# Patient Record
Sex: MALE | Race: WHITE | Employment: OTHER | URBAN - NONMETROPOLITAN AREA
[De-identification: names, ages, dates, MRNs, and addresses within clinical notes are randomized per-mention and may not be internally consistent; named-entity substitution may affect disease eponyms.]

---

## 2017-01-10 ENCOUNTER — TELEPHONE (OUTPATIENT)
Dept: OTHER | Age: 27
End: 2017-01-10

## 2017-01-12 ENCOUNTER — OFFICE VISIT (OUTPATIENT)
Dept: OTHER | Age: 27
End: 2017-01-12

## 2017-01-12 VITALS — WEIGHT: 213.4 LBS | BODY MASS INDEX: 33.49 KG/M2 | HEIGHT: 67 IN

## 2017-01-12 DIAGNOSIS — N18.4 CHRONIC KIDNEY DISEASE, STAGE 4 (SEVERE) (HCC): Primary | ICD-10-CM

## 2017-01-12 PROCEDURE — 97802 MEDICAL NUTRITION INDIV IN: CPT | Performed by: DIETITIAN, REGISTERED

## 2017-01-12 PROCEDURE — 1036F TOBACCO NON-USER: CPT | Performed by: DIETITIAN, REGISTERED

## 2017-03-02 LAB
BASOPHILS ABSOLUTE: ABNORMAL /ΜL
BASOPHILS RELATIVE PERCENT: ABNORMAL %
BUN BLDV-MCNC: 60 MG/DL
CALCIUM SERPL-MCNC: 9.4 MG/DL
CHLORIDE BLD-SCNC: 108 MMOL/L
CO2: 27 MMOL/L
CREAT SERPL-MCNC: 3.62 MG/DL
EOSINOPHILS ABSOLUTE: ABNORMAL /ΜL
EOSINOPHILS RELATIVE PERCENT: ABNORMAL %
GFR CALCULATED: 20
GLUCOSE BLD-MCNC: 90 MG/DL
HCT VFR BLD CALC: 37.8 % (ref 41–53)
HEMOGLOBIN: 13 G/DL (ref 13.5–17.5)
LYMPHOCYTES ABSOLUTE: ABNORMAL /ΜL
LYMPHOCYTES RELATIVE PERCENT: ABNORMAL %
MCH RBC QN AUTO: ABNORMAL PG
MCHC RBC AUTO-ENTMCNC: ABNORMAL G/DL
MCV RBC AUTO: ABNORMAL FL
MONOCYTES ABSOLUTE: ABNORMAL /ΜL
MONOCYTES RELATIVE PERCENT: ABNORMAL %
NEUTROPHILS ABSOLUTE: ABNORMAL /ΜL
NEUTROPHILS RELATIVE PERCENT: ABNORMAL %
PLATELET # BLD: 264 K/ΜL
PMV BLD AUTO: ABNORMAL FL
POTASSIUM SERPL-SCNC: 3.9 MMOL/L
RBC # BLD: 4.31 10^6/ΜL
SODIUM BLD-SCNC: 145 MMOL/L
VITAMIN D 25-HYDROXY: 36.69
VITAMIN D2, 25 HYDROXY: NORMAL
VITAMIN D3,25 HYDROXY: NORMAL
WBC # BLD: 12.3 10^3/ML

## 2017-03-03 ENCOUNTER — OFFICE VISIT (OUTPATIENT)
Dept: NEPHROLOGY | Age: 27
End: 2017-03-03

## 2017-03-03 VITALS
BODY MASS INDEX: 33.05 KG/M2 | RESPIRATION RATE: 16 BRPM | WEIGHT: 211 LBS | HEART RATE: 80 BPM | SYSTOLIC BLOOD PRESSURE: 138 MMHG | DIASTOLIC BLOOD PRESSURE: 90 MMHG

## 2017-03-03 DIAGNOSIS — N18.30 CHRONIC KIDNEY DISEASE, STAGE III (MODERATE) (HCC): Primary | ICD-10-CM

## 2017-03-03 PROCEDURE — G8427 DOCREV CUR MEDS BY ELIG CLIN: HCPCS | Performed by: INTERNAL MEDICINE

## 2017-03-03 PROCEDURE — G8417 CALC BMI ABV UP PARAM F/U: HCPCS | Performed by: INTERNAL MEDICINE

## 2017-03-03 PROCEDURE — 99213 OFFICE O/P EST LOW 20 MIN: CPT | Performed by: INTERNAL MEDICINE

## 2017-03-03 PROCEDURE — 1036F TOBACCO NON-USER: CPT | Performed by: INTERNAL MEDICINE

## 2017-03-03 PROCEDURE — G8484 FLU IMMUNIZE NO ADMIN: HCPCS | Performed by: INTERNAL MEDICINE

## 2017-03-03 RX ORDER — ALLOPURINOL 100 MG/1
100 TABLET ORAL DAILY
Qty: 180 TABLET | Refills: 3 | Status: SHIPPED | OUTPATIENT
Start: 2017-03-03 | End: 2017-07-24 | Stop reason: SDUPTHER

## 2017-03-03 ASSESSMENT — ENCOUNTER SYMPTOMS
DIARRHEA: 0
CHEST TIGHTNESS: 0
GASTROINTESTINAL NEGATIVE: 1
FACIAL SWELLING: 0
NAUSEA: 0
VOMITING: 0
RESPIRATORY NEGATIVE: 1
COUGH: 0
ABDOMINAL DISTENTION: 0
BACK PAIN: 0
WHEEZING: 0
SHORTNESS OF BREATH: 0
CONSTIPATION: 0
BLOOD IN STOOL: 0
ABDOMINAL PAIN: 0

## 2017-03-13 ENCOUNTER — TELEPHONE (OUTPATIENT)
Dept: NEPHROLOGY | Age: 27
End: 2017-03-13

## 2017-03-13 LAB
BUN BLDV-MCNC: 60 MG/DL
CALCIUM SERPL-MCNC: 10 MG/DL
CHLORIDE BLD-SCNC: 107 MMOL/L
CO2: 24 MMOL/L
CREAT SERPL-MCNC: 3.8 MG/DL
GFR CALCULATED: 20
GLUCOSE BLD-MCNC: 95 MG/DL
MAGNESIUM: 1.9 MG/DL
PHOSPHORUS: 4.9 MG/DL
POTASSIUM SERPL-SCNC: 4.2 MMOL/L
PTH INTACT: 23.4
SODIUM BLD-SCNC: 142 MMOL/L

## 2017-03-14 LAB
BASOPHILS ABSOLUTE: ABNORMAL /ΜL
BASOPHILS RELATIVE PERCENT: ABNORMAL %
CREATININE, URINE: 56
EOSINOPHILS ABSOLUTE: ABNORMAL /ΜL
EOSINOPHILS RELATIVE PERCENT: ABNORMAL %
HCT VFR BLD CALC: 38.3 % (ref 41–53)
HEMOGLOBIN: 13.3 G/DL (ref 13.5–17.5)
LYMPHOCYTES ABSOLUTE: ABNORMAL /ΜL
LYMPHOCYTES RELATIVE PERCENT: ABNORMAL %
MCH RBC QN AUTO: ABNORMAL PG
MCHC RBC AUTO-ENTMCNC: ABNORMAL G/DL
MCV RBC AUTO: ABNORMAL FL
MICROALBUMIN/CREAT 24H UR: 1862.3 MG/G{CREAT}
MICROALBUMIN/CREAT UR-RTO: 3325.5
MONOCYTES ABSOLUTE: ABNORMAL /ΜL
MONOCYTES RELATIVE PERCENT: ABNORMAL %
NEUTROPHILS ABSOLUTE: ABNORMAL /ΜL
NEUTROPHILS RELATIVE PERCENT: ABNORMAL %
PLATELET # BLD: 269 K/ΜL
PMV BLD AUTO: ABNORMAL FL
RBC # BLD: 4.45 10^6/ΜL
WBC # BLD: 13.09 10^3/ML

## 2017-04-12 ENCOUNTER — TELEPHONE (OUTPATIENT)
Dept: NEPHROLOGY | Age: 27
End: 2017-04-12

## 2017-04-15 LAB
ALBUMIN: 3.6
BUN BLDV-MCNC: 55 MG/DL
CALCIUM SERPL-MCNC: 9.3 MG/DL
CHLORIDE BLD-SCNC: 107 MMOL/L
CO2: 26 MMOL/L
CREAT SERPL-MCNC: 4.2 MG/DL
FERRITIN: 123 NG/ML (ref 18–300)
GFR CALCULATED: 18
GLUCOSE BLD-MCNC: NORMAL MG/DL
HCT VFR BLD CALC: 36.5 % (ref 41–53)
HEMOGLOBIN: 12.8 G/DL (ref 13.5–17.5)
PHOSPHORUS: 4.8 MG/DL
POTASSIUM SERPL-SCNC: 4.3 MMOL/L
PTH INTACT: 39.4
SODIUM BLD-SCNC: 143 MMOL/L

## 2017-04-18 ENCOUNTER — OFFICE VISIT (OUTPATIENT)
Dept: NEPHROLOGY | Age: 27
End: 2017-04-18

## 2017-04-18 VITALS
OXYGEN SATURATION: 98 % | SYSTOLIC BLOOD PRESSURE: 142 MMHG | BODY MASS INDEX: 33.83 KG/M2 | DIASTOLIC BLOOD PRESSURE: 78 MMHG | HEART RATE: 74 BPM | WEIGHT: 216 LBS

## 2017-04-18 DIAGNOSIS — N18.4 CHRONIC KIDNEY DISEASE, STAGE IV (SEVERE) (HCC): Primary | ICD-10-CM

## 2017-04-18 DIAGNOSIS — N18.30 CHRONIC KIDNEY DISEASE, STAGE III (MODERATE) (HCC): ICD-10-CM

## 2017-04-18 LAB
BACTERIA URINE, POC: ABNORMAL
BILIRUBIN URINE: ABNORMAL MG/DL
BLOOD, URINE: NEGATIVE
CASTS URINE, POC: ABNORMAL
CLARITY: CLEAR
COLOR: YELLOW
CRYSTALS URINE, POC: ABNORMAL
EPI CELLS URINE, POC: ABNORMAL
GLUCOSE URINE: NEGATIVE
KETONES, URINE: NEGATIVE
LEUKOCYTE EST, POC: NEGATIVE
NITRITE, URINE: NEGATIVE
PH UA: 5 (ref 4.5–8)
PROTEIN UA: POSITIVE
RBC URINE, POC: ABNORMAL
SPECIFIC GRAVITY UA: ABNORMAL (ref 1–1.03)
UROBILINOGEN, URINE: ABNORMAL
WBC URINE, POC: ABNORMAL
YEAST URINE, POC: ABNORMAL

## 2017-04-18 PROCEDURE — 1036F TOBACCO NON-USER: CPT | Performed by: INTERNAL MEDICINE

## 2017-04-18 PROCEDURE — 81002 URINALYSIS NONAUTO W/O SCOPE: CPT | Performed by: INTERNAL MEDICINE

## 2017-04-18 PROCEDURE — G8427 DOCREV CUR MEDS BY ELIG CLIN: HCPCS | Performed by: INTERNAL MEDICINE

## 2017-04-18 PROCEDURE — G8417 CALC BMI ABV UP PARAM F/U: HCPCS | Performed by: INTERNAL MEDICINE

## 2017-04-18 PROCEDURE — 99213 OFFICE O/P EST LOW 20 MIN: CPT | Performed by: INTERNAL MEDICINE

## 2017-04-18 RX ORDER — LAMOTRIGINE 100 MG/1
100 TABLET ORAL 2 TIMES DAILY
COMMUNITY
Start: 2017-04-06

## 2017-04-18 ASSESSMENT — ENCOUNTER SYMPTOMS
GASTROINTESTINAL NEGATIVE: 1
VOMITING: 0
SHORTNESS OF BREATH: 0
ABDOMINAL DISTENTION: 0
FACIAL SWELLING: 0
RESPIRATORY NEGATIVE: 1
BLOOD IN STOOL: 0
CONSTIPATION: 0
WHEEZING: 0
NAUSEA: 0
ABDOMINAL PAIN: 0
BACK PAIN: 0
DIARRHEA: 0
CHEST TIGHTNESS: 0
COUGH: 0

## 2017-06-17 LAB
ALBUMIN: 3.5
BASOPHILS ABSOLUTE: ABNORMAL /ΜL
BASOPHILS RELATIVE PERCENT: ABNORMAL %
BUN BLDV-MCNC: 65 MG/DL
CALCIUM SERPL-MCNC: 9 MG/DL
CHLORIDE BLD-SCNC: 110 MMOL/L
CO2: 24 MMOL/L
CREAT SERPL-MCNC: 4.3 MG/DL
EOSINOPHILS ABSOLUTE: ABNORMAL /ΜL
EOSINOPHILS RELATIVE PERCENT: ABNORMAL %
FERRITIN: 147 NG/ML (ref 18–300)
GFR CALCULATED: 18
GLUCOSE BLD-MCNC: 91 MG/DL
HCT VFR BLD CALC: 34.8 % (ref 41–53)
HEMOGLOBIN: 12 G/DL (ref 13.5–17.5)
LYMPHOCYTES ABSOLUTE: ABNORMAL /ΜL
LYMPHOCYTES RELATIVE PERCENT: ABNORMAL %
MCH RBC QN AUTO: ABNORMAL PG
MCHC RBC AUTO-ENTMCNC: ABNORMAL G/DL
MCV RBC AUTO: ABNORMAL FL
MONOCYTES ABSOLUTE: ABNORMAL /ΜL
MONOCYTES RELATIVE PERCENT: ABNORMAL %
NEUTROPHILS ABSOLUTE: ABNORMAL /ΜL
NEUTROPHILS RELATIVE PERCENT: ABNORMAL %
PHOSPHORUS: 4.8 MG/DL
PLATELET # BLD: 300 K/ΜL
PMV BLD AUTO: ABNORMAL FL
POTASSIUM SERPL-SCNC: 4.1 MMOL/L
PTH INTACT: 94.1
RBC # BLD: 4.04 10^6/ΜL
SODIUM BLD-SCNC: 91 MMOL/L
WBC # BLD: 10.49 10^3/ML

## 2017-06-20 ENCOUNTER — OFFICE VISIT (OUTPATIENT)
Dept: NEPHROLOGY | Age: 27
End: 2017-06-20

## 2017-06-20 VITALS
DIASTOLIC BLOOD PRESSURE: 82 MMHG | WEIGHT: 218 LBS | OXYGEN SATURATION: 99 % | SYSTOLIC BLOOD PRESSURE: 145 MMHG | BODY MASS INDEX: 35.19 KG/M2 | HEART RATE: 76 BPM

## 2017-06-20 DIAGNOSIS — N18.4 CHRONIC KIDNEY DISEASE, STAGE IV (SEVERE) (HCC): Primary | ICD-10-CM

## 2017-06-20 LAB
BACTERIA URINE, POC: ABNORMAL
BILIRUBIN URINE: ABNORMAL MG/DL
BLOOD, URINE: POSITIVE
CASTS URINE, POC: ABNORMAL
CLARITY: CLEAR
COLOR: YELLOW
CRYSTALS URINE, POC: ABNORMAL
EPI CELLS URINE, POC: ABNORMAL
GLUCOSE URINE: NEGATIVE
KETONES, URINE: NEGATIVE
LEUKOCYTE EST, POC: NEGATIVE
NITRITE, URINE: NEGATIVE
PH UA: 5 (ref 4.5–8)
PROTEIN UA: ABNORMAL
RBC URINE, POC: ABNORMAL
SPECIFIC GRAVITY UA: ABNORMAL (ref 1–1.03)
UROBILINOGEN, URINE: ABNORMAL
WBC URINE, POC: ABNORMAL
YEAST URINE, POC: ABNORMAL

## 2017-06-20 PROCEDURE — 1036F TOBACCO NON-USER: CPT | Performed by: INTERNAL MEDICINE

## 2017-06-20 PROCEDURE — G8417 CALC BMI ABV UP PARAM F/U: HCPCS | Performed by: INTERNAL MEDICINE

## 2017-06-20 PROCEDURE — G8427 DOCREV CUR MEDS BY ELIG CLIN: HCPCS | Performed by: INTERNAL MEDICINE

## 2017-06-20 PROCEDURE — 81000 URINALYSIS NONAUTO W/SCOPE: CPT | Performed by: INTERNAL MEDICINE

## 2017-06-20 PROCEDURE — 99213 OFFICE O/P EST LOW 20 MIN: CPT | Performed by: INTERNAL MEDICINE

## 2017-06-20 RX ORDER — AMLODIPINE BESYLATE 10 MG/1
5 TABLET ORAL DAILY
Qty: 100 TABLET | Refills: 3 | Status: SHIPPED | OUTPATIENT
Start: 2017-06-20 | End: 2017-08-10 | Stop reason: DRUGHIGH

## 2017-06-20 ASSESSMENT — ENCOUNTER SYMPTOMS
NAUSEA: 0
BACK PAIN: 0
RESPIRATORY NEGATIVE: 1
SHORTNESS OF BREATH: 0
GASTROINTESTINAL NEGATIVE: 1
DIARRHEA: 0
COUGH: 0
WHEEZING: 0
CHEST TIGHTNESS: 0
ABDOMINAL DISTENTION: 0
ABDOMINAL PAIN: 0
BLOOD IN STOOL: 0
CONSTIPATION: 0
VOMITING: 0
FACIAL SWELLING: 0

## 2017-07-18 RX ORDER — VIT B COMP NO.3/FOLIC/C/BIOTIN 1 MG-60 MG
TABLET ORAL
Qty: 90 TABLET | Refills: 3 | Status: SHIPPED | OUTPATIENT
Start: 2017-07-18 | End: 2018-03-20 | Stop reason: ALTCHOICE

## 2017-07-31 RX ORDER — ALLOPURINOL 100 MG/1
100 TABLET ORAL DAILY
Qty: 90 TABLET | Refills: 1 | Status: SHIPPED | OUTPATIENT
Start: 2017-07-31 | End: 2018-02-15 | Stop reason: SDUPTHER

## 2017-08-10 RX ORDER — AMLODIPINE BESYLATE 10 MG/1
10 TABLET ORAL DAILY
COMMUNITY
End: 2018-03-20 | Stop reason: ALTCHOICE

## 2017-08-26 LAB
BASOPHILS ABSOLUTE: ABNORMAL /ΜL
BASOPHILS RELATIVE PERCENT: ABNORMAL %
BUN BLDV-MCNC: 67 MG/DL
CALCIUM SERPL-MCNC: 9 MG/DL
CHLORIDE BLD-SCNC: 107 MMOL/L
CO2: 24 MMOL/L
CREAT SERPL-MCNC: 4.8 MG/DL
CREATININE, URINE: 61.1
EOSINOPHILS ABSOLUTE: ABNORMAL /ΜL
EOSINOPHILS RELATIVE PERCENT: ABNORMAL %
GFR CALCULATED: 15
GLUCOSE BLD-MCNC: 83 MG/DL
HCT VFR BLD CALC: 35.5 % (ref 41–53)
HEMOGLOBIN: 12.5 G/DL (ref 13.5–17.5)
LYMPHOCYTES ABSOLUTE: ABNORMAL /ΜL
LYMPHOCYTES RELATIVE PERCENT: ABNORMAL %
MCH RBC QN AUTO: ABNORMAL PG
MCHC RBC AUTO-ENTMCNC: ABNORMAL G/DL
MCV RBC AUTO: ABNORMAL FL
MICROALBUMIN/CREAT 24H UR: 2164.5 MG/G{CREAT}
MICROALBUMIN/CREAT UR-RTO: 3542.6
MONOCYTES ABSOLUTE: ABNORMAL /ΜL
MONOCYTES RELATIVE PERCENT: ABNORMAL %
NEUTROPHILS ABSOLUTE: ABNORMAL /ΜL
NEUTROPHILS RELATIVE PERCENT: ABNORMAL %
PHOSPHORUS: 5.1 MG/DL
PLATELET # BLD: 261 K/ΜL
PMV BLD AUTO: ABNORMAL FL
POTASSIUM SERPL-SCNC: 4.2 MMOL/L
PTH INTACT: 105.8
RBC # BLD: 4.13 10^6/ΜL
SODIUM BLD-SCNC: 142 MMOL/L
URIC ACID: 7.1
WBC # BLD: 9.4 10^3/ML

## 2017-08-29 ENCOUNTER — OFFICE VISIT (OUTPATIENT)
Dept: NEPHROLOGY | Age: 27
End: 2017-08-29
Payer: COMMERCIAL

## 2017-08-29 VITALS
OXYGEN SATURATION: 99 % | BODY MASS INDEX: 35.99 KG/M2 | SYSTOLIC BLOOD PRESSURE: 143 MMHG | WEIGHT: 223 LBS | HEART RATE: 68 BPM | DIASTOLIC BLOOD PRESSURE: 98 MMHG

## 2017-08-29 DIAGNOSIS — N18.4 CHRONIC KIDNEY DISEASE, STAGE IV (SEVERE) (HCC): Primary | ICD-10-CM

## 2017-08-29 LAB
BACTERIA URINE, POC: ABNORMAL
BILIRUBIN URINE: ABNORMAL MG/DL
BLOOD, URINE: POSITIVE
CASTS URINE, POC: ABNORMAL
CLARITY: CLEAR
COLOR: ABNORMAL
CRYSTALS URINE, POC: ABNORMAL
EPI CELLS URINE, POC: ABNORMAL
GLUCOSE URINE: NEGATIVE
KETONES, URINE: NEGATIVE
LEUKOCYTE EST, POC: NEGATIVE
NITRITE, URINE: NEGATIVE
PH UA: 5 (ref 4.5–8)
PROTEIN UA: ABNORMAL
RBC URINE, POC: ABNORMAL
SPECIFIC GRAVITY UA: ABNORMAL (ref 1–1.03)
UROBILINOGEN, URINE: ABNORMAL
WBC URINE, POC: ABNORMAL
YEAST URINE, POC: ABNORMAL

## 2017-08-29 PROCEDURE — G8417 CALC BMI ABV UP PARAM F/U: HCPCS | Performed by: INTERNAL MEDICINE

## 2017-08-29 PROCEDURE — 1036F TOBACCO NON-USER: CPT | Performed by: INTERNAL MEDICINE

## 2017-08-29 PROCEDURE — G8427 DOCREV CUR MEDS BY ELIG CLIN: HCPCS | Performed by: INTERNAL MEDICINE

## 2017-08-29 PROCEDURE — 81000 URINALYSIS NONAUTO W/SCOPE: CPT | Performed by: INTERNAL MEDICINE

## 2017-08-29 PROCEDURE — 99213 OFFICE O/P EST LOW 20 MIN: CPT | Performed by: INTERNAL MEDICINE

## 2017-08-29 ASSESSMENT — ENCOUNTER SYMPTOMS
GASTROINTESTINAL NEGATIVE: 1
ABDOMINAL DISTENTION: 0
CHEST TIGHTNESS: 0
ABDOMINAL PAIN: 0
CONSTIPATION: 0
WHEEZING: 0
DIARRHEA: 0
BACK PAIN: 0
FACIAL SWELLING: 0
SHORTNESS OF BREATH: 0
COUGH: 0
BLOOD IN STOOL: 0
RESPIRATORY NEGATIVE: 1
VOMITING: 0
NAUSEA: 0

## 2017-10-06 ENCOUNTER — TELEPHONE (OUTPATIENT)
Dept: NEPHROLOGY | Age: 27
End: 2017-10-06

## 2017-10-28 LAB
ALBUMIN: 3.5
BUN BLDV-MCNC: 77 MG/DL
CALCIUM SERPL-MCNC: 9.2 MG/DL
CHLORIDE BLD-SCNC: 109 MMOL/L
CO2: 21 MMOL/L
CREAT SERPL-MCNC: 5.2 MG/DL
FERRITIN: 102 NG/ML (ref 18–300)
GFR CALCULATED: 14
GLUCOSE BLD-MCNC: 99 MG/DL
HCT VFR BLD CALC: 34.2 % (ref 41–53)
HEMOGLOBIN: 11.8 G/DL (ref 13.5–17.5)
IRON SATURATION: 28
IRON: 59
PHOSPHORUS: 5.7 MG/DL
POTASSIUM SERPL-SCNC: 3.9 MMOL/L
PTH INTACT: 82.6
RETICULOCYTE ABSOLUTE COUNT: NORMAL
RETICULOCYTE COUNT PCT: 1.5
SODIUM BLD-SCNC: 145 MMOL/L
TOTAL IRON BINDING CAPACITY: 210
VITAMIN D 25-HYDROXY: 24.5
VITAMIN D2, 25 HYDROXY: NORMAL
VITAMIN D3,25 HYDROXY: NORMAL

## 2017-10-31 ENCOUNTER — OFFICE VISIT (OUTPATIENT)
Dept: NEPHROLOGY | Age: 27
End: 2017-10-31
Payer: COMMERCIAL

## 2017-10-31 VITALS
OXYGEN SATURATION: 99 % | WEIGHT: 214.5 LBS | DIASTOLIC BLOOD PRESSURE: 85 MMHG | HEART RATE: 61 BPM | SYSTOLIC BLOOD PRESSURE: 162 MMHG | BODY MASS INDEX: 34.62 KG/M2

## 2017-10-31 DIAGNOSIS — N18.30 CHRONIC KIDNEY DISEASE, STAGE III (MODERATE) (HCC): Primary | ICD-10-CM

## 2017-10-31 LAB
BACTERIA URINE, POC: ABNORMAL
BILIRUBIN URINE: ABNORMAL MG/DL
BLOOD, URINE: POSITIVE
CASTS URINE, POC: ABNORMAL
CLARITY: CLEAR
COLOR: YELLOW
CRYSTALS URINE, POC: ABNORMAL
EPI CELLS URINE, POC: ABNORMAL
GLUCOSE URINE: NEGATIVE
KETONES, URINE: NEGATIVE
LEUKOCYTE EST, POC: NEGATIVE
NITRITE, URINE: NEGATIVE
PH UA: 6 (ref 4.5–8)
PROTEIN UA: ABNORMAL
RBC URINE, POC: ABNORMAL
SPECIFIC GRAVITY UA: ABNORMAL (ref 1–1.03)
UROBILINOGEN, URINE: ABNORMAL
WBC URINE, POC: ABNORMAL
YEAST URINE, POC: ABNORMAL

## 2017-10-31 PROCEDURE — G8427 DOCREV CUR MEDS BY ELIG CLIN: HCPCS | Performed by: INTERNAL MEDICINE

## 2017-10-31 PROCEDURE — G8484 FLU IMMUNIZE NO ADMIN: HCPCS | Performed by: INTERNAL MEDICINE

## 2017-10-31 PROCEDURE — G8417 CALC BMI ABV UP PARAM F/U: HCPCS | Performed by: INTERNAL MEDICINE

## 2017-10-31 PROCEDURE — 99213 OFFICE O/P EST LOW 20 MIN: CPT | Performed by: INTERNAL MEDICINE

## 2017-10-31 PROCEDURE — 81000 URINALYSIS NONAUTO W/SCOPE: CPT | Performed by: INTERNAL MEDICINE

## 2017-10-31 PROCEDURE — 1036F TOBACCO NON-USER: CPT | Performed by: INTERNAL MEDICINE

## 2017-10-31 ASSESSMENT — ENCOUNTER SYMPTOMS
VOMITING: 0
NAUSEA: 0
FACIAL SWELLING: 0
ABDOMINAL PAIN: 0
CONSTIPATION: 0
GASTROINTESTINAL NEGATIVE: 1
COUGH: 0
SHORTNESS OF BREATH: 0
DIARRHEA: 0
ABDOMINAL DISTENTION: 0
BACK PAIN: 0
RESPIRATORY NEGATIVE: 1
WHEEZING: 0
BLOOD IN STOOL: 0
CHEST TIGHTNESS: 0

## 2017-10-31 NOTE — PROGRESS NOTES
Visit Date: 10/31/2017      HPI:     Jacob Carolina is a 32 y.o. male who presents today for:  Chief Complaint   Patient presents with    Chronic Kidney Disease     Stage IV    Hypertension    Proteinuria       Current Outpatient Prescriptions   Medication Sig Dispense Refill    amLODIPine (NORVASC) 10 MG tablet Take 10 mg by mouth daily      allopurinol (ZYLOPRIM) 100 MG tablet Take 1 tablet by mouth daily 90 tablet 1    B Complex-C-Folic Acid (TORY-BLAKE RX) 1 MG TABS TAKE ONE TABLET BY MOUTH ONCE DAILY 90 tablet 3    lamoTRIgine (LAMICTAL) 25 MG tablet Take 25 mg by mouth 2 times daily       risperiDONE (RISPERDAL) 0.5 MG tablet Take 2 tablets by mouth every evening 60 tablet 2    LORazepam (ATIVAN) 1 MG tablet Take 1 tablet by mouth 3 times daily as needed      vitamin D (ERGOCALCIFEROL) 31778 UNITS CAPS capsule TAKE ONE CAPSULE BY MOUTH TWICE A MONTH (Patient taking differently: Take 50,000 Units by mouth once a week ) 6 capsule 3    NASONEX 50 MCG/ACT nasal spray USE TWO SPRAY(S) IN EACH NOSTRIL ONCE DAILY 1 Inhaler 3    levothyroxine (SYNTHROID) 88 MCG tablet Take 88 mcg by mouth daily       Magnesium Chloride (SLOW-MAG PO) Take 143 mg by mouth 2 times daily.  fexofenadine (ALLEGRA) 60 MG tablet Take 180 mg by mouth daily. No current facility-administered medications for this visit. Allergies   Allergen Reactions    Ceclor [Cefaclor]     Lisinopril      Should avoid taking r/t potassium issues, not true allergy    Tegretol [Carbamazepine]        Past Medical History:   Diagnosis Date    Anemia in chronic kidney disease(285.21)     Bowel obstruction 1988    Cerebral palsy (HCC)     Chronic kidney disease, stage IV (severe) (HCC)     if develops ESRD would do peritoneal dialysis and possible kidney transplant.     Developmental disorder     Hard of hearing     Hypertension     Hypothyroidism     Mentally disabled     Pre-transplant evaluation for CKD (chronic kidney 06/02/2016    ALKPHOS 75 04/02/2016     BNP: No results found for: BNP  Lipids: No results found for: CHOL, HDL  INR: No results found for: INR       URINE: No results found for: Gerardo Hook                            Lab Results   Component Value Date    NITRU Negative 08/29/2017    COLORU Light Yellow 08/29/2017    PHUR 5.0 08/29/2017    CLARITYU Clear 08/29/2017    LEUKOCYTESUR negative 08/29/2017    BLOODU Positive 08/29/2017    GLUCOSEU negative 08/29/2017    KETUA Negative 08/29/2017         Microalbumen/Creatinine ratio:  No components found for: RUCREAT  Assessment:   Chronic kidney disease stage 5 progressive   Behavior diorder            Plan:   Discussed capd and soft uremia  Discussed living donor transplant  Fu 6 months  Serial labs  The goal is to avoid nephrotoxins such as NSAIDs, contrast and dehydration. The patient was informed of these risk factors. If there are any questions the the patient is informed to call for further assistance.               Jalyn Silvestre DO

## 2017-11-01 ENCOUNTER — TELEPHONE (OUTPATIENT)
Dept: NEPHROLOGY | Age: 27
End: 2017-11-01

## 2017-11-01 DIAGNOSIS — N18.4 CKD (CHRONIC KIDNEY DISEASE), STAGE IV (HCC): ICD-10-CM

## 2017-11-01 DIAGNOSIS — N18.4 ANEMIA IN STAGE 4 CHRONIC KIDNEY DISEASE (HCC): Primary | ICD-10-CM

## 2017-11-01 DIAGNOSIS — D63.1 ANEMIA IN STAGE 4 CHRONIC KIDNEY DISEASE (HCC): Primary | ICD-10-CM

## 2017-11-01 NOTE — TELEPHONE ENCOUNTER
Chronic Care Management    John Paul Senior is a 32 y.o.oldmale Is followed for Chronic Care Management for kidney disease, Stage IV. Diagnostic Data:    BMP:  Lab Results   Component Value Date     10/28/2017    K 3.9 10/28/2017     10/28/2017    CO2 21 10/28/2017    BUN 77 10/28/2017    CREATININE 5.2 10/28/2017    LABGLOM 14 10/28/2017    LABGLOM 26 06/02/2016    GLUCOSE 99 10/28/2017       Anemia: Goal hgb 10 g/dL  Lab Results   Component Value Date    HGB 11.8 10/28/2017    HGB 12.5 08/26/2017    HGB 12.0 06/17/2017    HGB 12.8 04/15/2017    HGB 13.3 03/14/2017      Lab Results   Component Value Date    FERRITIN 102 10/28/2017    IRON 59 10/28/2017    LABIRON 28 10/28/2017     No results found for: RETICABS  Meds:  Aranesp: None   PO Iron: None   Venofer: None    Bone and Mineral Metabolism:   Goal:  Ca: 8.5-10.2 mg/dL   Phos:<4.5 mg/dL   Vit D:>30 ng/ml   PTH,Intact: CKD Stage III and IV Non Diab:   pg/dL. Diab: 110-150. Initiate Treatment at 150 pg/dL   PTH: CKD Stage V: 150-300. Initiate treatment at 300 pg/dL  Lab Results   Component Value Date    PHOS 5.7 10/28/2017    CALCIUM 9.2 10/28/2017     Meds: Phosphate binder needed? ??    PTH 82.6 10/28/17    Lab Results   Component Value Date    VITD25 24.5 10/28/2017     Meds: On Ergocalciferol 50,000 units twice monthly. Assessment:     Quarterly labs reviewed. May need initiation of phosphate binder. (Sent to Dr. Katerine Flor for review)  Progressive CKD IV, in transplant workup. Continuew with next labs per renal clinic orders in January 2018. Next appointment in this department:  11/15/2017 with me  12/11/17 with Dr. Katerine Flor.      Neville Sandhoff, CNP    Time spent- 17 minutes non face-to-face

## 2017-11-15 ENCOUNTER — OFFICE VISIT (OUTPATIENT)
Dept: NEPHROLOGY | Age: 27
End: 2017-11-15
Payer: COMMERCIAL

## 2017-11-15 VITALS
WEIGHT: 217 LBS | HEART RATE: 65 BPM | BODY MASS INDEX: 35.02 KG/M2 | DIASTOLIC BLOOD PRESSURE: 98 MMHG | OXYGEN SATURATION: 99 % | SYSTOLIC BLOOD PRESSURE: 158 MMHG

## 2017-11-15 DIAGNOSIS — N18.5 CKD (CHRONIC KIDNEY DISEASE), STAGE V (HCC): ICD-10-CM

## 2017-11-15 DIAGNOSIS — D63.1 ANEMIA IN STAGE 5 CHRONIC KIDNEY DISEASE, NOT ON CHRONIC DIALYSIS (HCC): Primary | ICD-10-CM

## 2017-11-15 DIAGNOSIS — N18.5 ANEMIA IN STAGE 5 CHRONIC KIDNEY DISEASE, NOT ON CHRONIC DIALYSIS (HCC): Primary | ICD-10-CM

## 2017-11-15 PROCEDURE — 99214 OFFICE O/P EST MOD 30 MIN: CPT | Performed by: NURSE PRACTITIONER

## 2017-11-15 NOTE — PROGRESS NOTES
Chronic Care Management Initial Visit    Patient Consent for Chronic Care Management Signed: Date: 11/15/2017    Primary Nephrologist: Dr Giulia Ratliff is a 32 y.o.oldmale came for follow up for Chronic Care Management for kidney disease, awaiting transplant approval. He hasn't been drinking much, has to be encouraged to drink. Having some swelling, fatigue, increased SOB. Sleeping all of the time. Renetta Kirkland most recent   Lab Results   Component Value Date    CREATININE 5.2 10/28/2017    LABGLOM 14 10/28/2017    LABGLOM 26 06/02/2016   . VITALS:  BP (!) 158/98 (Site: Right Arm, Position: Sitting, Cuff Size: Small Adult)   Pulse 65   Wt 217 lb (98.4 kg)   SpO2 99%   BMI 35.02 kg/m²   Body mass index is 35.02 kg/m². REVIEW OF SYSTEMS:   GENERAL: States he is fatigued, sleeps a lot. Low thirst, not drinking much. Weight is 3#  HEENT:  Denies Upper respiratory complaints  CARDIOVASCULAR: Denies chest pain/angina. RESPIRATORY: Some increased sob  ABDOMEN: Denies nausea, vomiting  CNS:Denies headache, dizziness  MUSCULOSKELETAL: Reports joint arthralgia, reports edema  SKIN: Denies rash, pruritis  HEMATOLOGIC: Denies bruising  ENDOCRINE:  Negative for heat or cold intolerance     EXAM:  VITALS:  BP (!) 158/98 (Site: Right Arm, Position: Sitting, Cuff Size: Small Adult)   Pulse 65   Wt 217 lb (98.4 kg)   SpO2 99%   BMI 35.02 kg/m²    Body mass index is 35.02 kg/m². Recheck /92  CONSTITUTIONAL:  No acute distress. HEENT:  Head is normocephalic, Neck is supple. Hearing aids. CARDIOVASCULAR: 2+ pitting edema  RESPIRATORY: No shortness of breath. NEUROLOGICAL: Patient is alert and oriented to person, place, and time.   SKIN: No rash on exposed surfaces, No significant bruises  MUSCULOSKELETAL: Movement is coordinated  EXTREMITIES: Moves all extremities  PSYCHIATRIC: mood and affect appropriate    Home Medication reviewed  The pt states compliance with meds and denies adverse effects. Uses pill box: yes - Mom helps with meds. Diagnostic Data:    Labs reviewed and discussed with pt  BMP:  Lab Results   Component Value Date     10/28/2017    K 3.9 10/28/2017     10/28/2017    CO2 21 10/28/2017    BUN 77 10/28/2017    CREATININE 5.2 10/28/2017    LABGLOM 14 10/28/2017    LABGLOM 26 06/02/2016    GLUCOSE 99 10/28/2017       Anemia: Goal hgb 10 g/dL  Education provided regarding the mechanism behind anemia associated with Chronic Kidney Disease. Lab Results   Component Value Date    HGB 11.8 10/28/2017    HGB 12.5 08/26/2017    HGB 12.0 06/17/2017    HGB 12.8 04/15/2017    HGB 13.3 03/14/2017      Lab Results   Component Value Date    FERRITIN 102 10/28/2017    IRON 59 10/28/2017    LABIRON 28 10/28/2017     No results found for: RETICABS  Last Colonoscopy Date:   Meds:  Aranesp: None   PO Iron: None   Venofer: None    Bone and Mineral Metabolism:   Education provided regarding the mechanism behind Ca, PTH, Phos, Vit D and bone health in Chronic Kidney Disease   Referral to Dietician for Phosphorus over 4.5     Immunization:  Immunization History   Administered Date(s) Administered    Influenza Vaccine, unspecified formulation 11/13/2013       Dialysis Plan:   Discussion and education provided regarding potential progression to End Stage Renal Disease and options for dialysis including Hemodialysis, Peritoneal dialysis, Transplant and No dialysis. Peritoneal Dialysis - Preferred modality    Transplant:    Transplant Center: [facility]   Referral Date: In progress      Needs: The pt denies that they need help with the phone, transportation, shopping, preparing meals, housework, laundry, medications or managing money? Advanced Care Planning:   Face to Face Discussion Date:  Full Code   DNR-Comfort Care Arrest  DNR-Comfort Care  DNR form completed Date:                    Assessment:   1.  Anemia in stage 5 chronic kidney disease, not on chronic dialysis ([facility] [address] Hemoglobin and Hematocrit, Blood   2. CKD (chronic kidney disease), stage V (HCC)  Hemoglobin and Hematocrit, Blood     Encounter Diagnoses   Name Primary?  Anemia in stage 5 chronic kidney disease, not on chronic dialysis ([facility] [address] Yes    CKD (chronic kidney disease), stage V ([facility] [address]      Plan:  Orders Placed This Encounter   Procedures    Hemoglobin and Hematocrit, Blood     Monthly for 12 months     Standing Status:   Standing     Number of Occurrences:   12     Standing Expiration Date:   11/15/2018     No orders of the defined types were placed in this encounter. Discussed with the patient and questioned answered. Patient advised to make early appointment if needed and to call office for questions, concerns, persistent, changing or worsening symptoms.     Next appointment in this department:  12/11/2017 Keep your appt with Dr. Isabel Medina   Follow Up at Pre Renal Clinic in 2 months    H&H in a month, then monthly    Nita Tang CNP APRN

## 2017-12-04 ENCOUNTER — TELEPHONE (OUTPATIENT)
Dept: NEPHROLOGY | Age: 27
End: 2017-12-04

## 2017-12-04 NOTE — TELEPHONE ENCOUNTER
Mother called and states pt is scheduled for kidney transplant on 12/12/17. Pt has an appt to see you on 12/11/17. Do you still need to see him?

## 2018-01-11 PROBLEM — I10 ESSENTIAL HYPERTENSION, BENIGN: Status: ACTIVE | Noted: 2018-01-11

## 2018-01-11 PROBLEM — Z94.0 LIVING RELATED DONOR RENAL TRANSPLANT: Status: ACTIVE | Noted: 2017-12-12

## 2018-02-15 RX ORDER — ALLOPURINOL 100 MG/1
100 TABLET ORAL DAILY
Qty: 90 TABLET | Refills: 1 | Status: SHIPPED | OUTPATIENT
Start: 2018-02-15 | End: 2018-03-20 | Stop reason: DRUGHIGH

## 2018-03-15 RX ORDER — EVEROLIMUS TABLETS 0.5 MG/1
0.5 TABLET ORAL
COMMUNITY
Start: 2018-01-29 | End: 2021-12-27 | Stop reason: SDUPTHER

## 2018-03-15 RX ORDER — CYCLOSPORINE 25 MG/1
100 CAPSULE, LIQUID FILLED ORAL
COMMUNITY
Start: 2018-01-05 | End: 2019-02-14

## 2018-03-20 ENCOUNTER — OFFICE VISIT (OUTPATIENT)
Dept: NEPHROLOGY | Age: 28
End: 2018-03-20
Payer: COMMERCIAL

## 2018-03-20 VITALS
WEIGHT: 206.7 LBS | OXYGEN SATURATION: 100 % | HEART RATE: 92 BPM | BODY MASS INDEX: 33.36 KG/M2 | SYSTOLIC BLOOD PRESSURE: 137 MMHG | DIASTOLIC BLOOD PRESSURE: 84 MMHG

## 2018-03-20 DIAGNOSIS — N18.30 CHRONIC KIDNEY DISEASE, STAGE III (MODERATE) (HCC): Primary | ICD-10-CM

## 2018-03-20 LAB
BACTERIA URINE, POC: ABNORMAL
BILIRUBIN URINE: ABNORMAL MG/DL
BLOOD, URINE: NEGATIVE
CASTS URINE, POC: ABNORMAL
CLARITY: CLEAR
COLOR: ABNORMAL
CRYSTALS URINE, POC: ABNORMAL
EPI CELLS URINE, POC: ABNORMAL
GLUCOSE URINE: NEGATIVE
KETONES, URINE: NEGATIVE
LEUKOCYTE EST, POC: NEGATIVE
NITRITE, URINE: NEGATIVE
PH UA: 5 (ref 4.5–8)
PROTEIN UA: POSITIVE
RBC URINE, POC: ABNORMAL
SPECIFIC GRAVITY UA: ABNORMAL (ref 1–1.03)
UROBILINOGEN, URINE: ABNORMAL
WBC URINE, POC: ABNORMAL
YEAST URINE, POC: ABNORMAL

## 2018-03-20 PROCEDURE — 1036F TOBACCO NON-USER: CPT | Performed by: INTERNAL MEDICINE

## 2018-03-20 PROCEDURE — G8417 CALC BMI ABV UP PARAM F/U: HCPCS | Performed by: INTERNAL MEDICINE

## 2018-03-20 PROCEDURE — 99213 OFFICE O/P EST LOW 20 MIN: CPT | Performed by: INTERNAL MEDICINE

## 2018-03-20 PROCEDURE — 81002 URINALYSIS NONAUTO W/O SCOPE: CPT | Performed by: INTERNAL MEDICINE

## 2018-03-20 PROCEDURE — G8427 DOCREV CUR MEDS BY ELIG CLIN: HCPCS | Performed by: INTERNAL MEDICINE

## 2018-03-20 PROCEDURE — G8484 FLU IMMUNIZE NO ADMIN: HCPCS | Performed by: INTERNAL MEDICINE

## 2018-03-20 RX ORDER — ALLOPURINOL 300 MG/1
300 TABLET ORAL DAILY
COMMUNITY
End: 2019-03-21 | Stop reason: SDUPTHER

## 2018-03-20 RX ORDER — PANTOPRAZOLE SODIUM 40 MG/1
40 TABLET, DELAYED RELEASE ORAL
COMMUNITY
Start: 2018-01-30

## 2018-03-20 RX ORDER — LABETALOL 100 MG/1
100 TABLET, FILM COATED ORAL 3 TIMES DAILY
COMMUNITY
Start: 2018-02-21 | End: 2022-01-25 | Stop reason: SDUPTHER

## 2018-03-20 RX ORDER — SULFAMETHOXAZOLE AND TRIMETHOPRIM 800; 160 MG/1; MG/1
TABLET ORAL
COMMUNITY
Start: 2018-02-23 | End: 2019-02-14

## 2018-03-20 ASSESSMENT — ENCOUNTER SYMPTOMS
RESPIRATORY NEGATIVE: 1
GASTROINTESTINAL NEGATIVE: 1

## 2018-03-20 NOTE — PROGRESS NOTES
Visit Date: 3/20/2018      HPI:     Karoline Ojeda is a 32 y.o. male who presents today for:  Chief Complaint   Patient presents with    Kidney Transplant    Chronic Kidney Disease     Stage III    Hypertension       Current Outpatient Prescriptions   Medication Sig Dispense Refill    allopurinol (ZYLOPRIM) 300 MG tablet Take 300 mg by mouth daily      labetalol (NORMODYNE) 100 MG tablet Take 100 mg by mouth      nystatin (MYCOSTATIN) 316933 UNIT/ML suspension 100,000 Units by Transmucosal route      pantoprazole (PROTONIX) 40 MG tablet Take 40 mg by mouth      sulfamethoxazole-trimethoprim (BACTRIM DS;SEPTRA DS) 800-160 MG per tablet Take by mouth      aspirin 81 MG tablet Take 81 mg by mouth daily      Everolimus 0.5 MG TABS Take 0.5 mg by mouth 1 mg in am and 0.5 mg in pm      cycloSPORINE modified (GENGRAF) 25 MG capsule Take 100 mg by mouth       lamoTRIgine (LAMICTAL) 25 MG tablet Take 25 mg by mouth 2 times daily       risperiDONE (RISPERDAL) 0.5 MG tablet Take 2 tablets by mouth every evening 60 tablet 2    LORazepam (ATIVAN) 1 MG tablet Take 1 tablet by mouth 3 times daily as needed      NASONEX 50 MCG/ACT nasal spray USE TWO SPRAY(S) IN EACH NOSTRIL ONCE DAILY 1 Inhaler 3    levothyroxine (SYNTHROID) 88 MCG tablet Take 88 mcg by mouth daily       fexofenadine (ALLEGRA) 60 MG tablet Take 180 mg by mouth daily. No current facility-administered medications for this visit. Allergies   Allergen Reactions    Ceclor [Cefaclor]     Lisinopril      Should avoid taking r/t potassium issues, not true allergy    Tegretol [Carbamazepine]        Past Medical History:   Diagnosis Date    Anemia in chronic kidney disease(285.21)     Bowel obstruction 1988    Cerebral palsy (HCC)     Chronic kidney disease, stage IV (severe) (HCC)     if develops ESRD would do peritoneal dialysis and possible kidney transplant.     Developmental disorder     Hard of hearing     Hypertension     alert and oriented to person, place, and time. Skin: Skin is warm and dry. No rash noted. He is not diaphoretic. No pallor. Psychiatric: He has a normal mood and affect. His behavior is normal. Judgment and thought content normal.   Vitals reviewed. CBC:   Lab Results   Component Value Date    WBC 9.40 08/26/2017    HGB 11.8 (A) 10/28/2017    HCT 34.2 (A) 10/28/2017    MCV 87.0 06/02/2016     08/26/2017     BMP:    Lab Results   Component Value Date     10/28/2017     08/26/2017    NA 91 06/17/2017    K 3.9 10/28/2017    K 4.2 08/26/2017    K 4.1 06/17/2017     10/28/2017     08/26/2017     06/17/2017    CO2 21 10/28/2017    CO2 24 08/26/2017    CO2 24 06/17/2017    BUN 77 10/28/2017    BUN 67 08/26/2017    BUN 65 06/17/2017    CREATININE 5.2 10/28/2017    CREATININE 4.8 08/26/2017    CREATININE 4.3 06/17/2017    GLUCOSE 99 10/28/2017    GLUCOSE 83 08/26/2017    GLUCOSE 91 06/17/2017   creat range 1.7-1.9 Hemoglobin 9.5  Hepatic:   Lab Results   Component Value Date    AST 51 (H) 06/02/2016    AST 26 04/02/2016    ALT 31 06/02/2016    ALT 33 04/02/2016    BILITOT 0.5 06/02/2016    BILITOT 0.3 04/02/2016    ALKPHOS 68 06/02/2016    ALKPHOS 75 04/02/2016     BNP: No results found for: BNP  Lipids: No results found for: CHOL, HDL  INR: No results found for: INR       URINE: No results found for: NAUR, PROTUR                            Lab Results   Component Value Date    NITRU Negative 10/31/2017    COLORU Yellow 10/31/2017    PHUR 6.0 10/31/2017    CLARITYU Clear 10/31/2017    LEUKOCYTESUR negative 10/31/2017    BLOODU Positive 10/31/2017    GLUCOSEU negative 10/31/2017    KETUA Negative 10/31/2017         Microalbumen/Creatinine ratio:  No components found for: RUCREAT    Assessment:     1. Chronic kidney disease, stage III (moderate)  POC URINE with Microscopic   post living related donor 3 months  Hypertension controlled  hypothyroidism          Plan:    Fu 6 months

## 2018-04-03 ENCOUNTER — TELEPHONE (OUTPATIENT)
Dept: NEPHROLOGY | Age: 28
End: 2018-04-03

## 2018-05-24 ENCOUNTER — INITIAL CONSULT (OUTPATIENT)
Dept: PULMONOLOGY | Age: 28
End: 2018-05-24
Payer: COMMERCIAL

## 2018-05-24 ENCOUNTER — TELEPHONE (OUTPATIENT)
Dept: PULMONOLOGY | Age: 28
End: 2018-05-24

## 2018-05-24 VITALS
WEIGHT: 210 LBS | HEART RATE: 80 BPM | BODY MASS INDEX: 33.75 KG/M2 | HEIGHT: 66 IN | DIASTOLIC BLOOD PRESSURE: 84 MMHG | OXYGEN SATURATION: 99 % | SYSTOLIC BLOOD PRESSURE: 128 MMHG

## 2018-05-24 DIAGNOSIS — G80.4 ATAXIC CEREBRAL PALSY (HCC): ICD-10-CM

## 2018-05-24 DIAGNOSIS — I10 ESSENTIAL HYPERTENSION: ICD-10-CM

## 2018-05-24 DIAGNOSIS — F79 MENTAL RETARDATION: ICD-10-CM

## 2018-05-24 DIAGNOSIS — G47.10 HYPERSOMNIA: Primary | ICD-10-CM

## 2018-05-24 DIAGNOSIS — R06.83 SNORING: ICD-10-CM

## 2018-05-24 PROCEDURE — 1036F TOBACCO NON-USER: CPT | Performed by: INTERNAL MEDICINE

## 2018-05-24 PROCEDURE — G8427 DOCREV CUR MEDS BY ELIG CLIN: HCPCS | Performed by: INTERNAL MEDICINE

## 2018-05-24 PROCEDURE — G8417 CALC BMI ABV UP PARAM F/U: HCPCS | Performed by: INTERNAL MEDICINE

## 2018-05-24 PROCEDURE — 99203 OFFICE O/P NEW LOW 30 MIN: CPT | Performed by: INTERNAL MEDICINE

## 2018-05-24 RX ORDER — FERROUS SULFATE 325(65) MG
325 TABLET ORAL
COMMUNITY

## 2018-05-24 RX ORDER — AMLODIPINE BESYLATE 5 MG/1
5 TABLET ORAL DAILY
COMMUNITY
End: 2019-03-21 | Stop reason: SDUPTHER

## 2018-05-24 RX ORDER — FUROSEMIDE 20 MG/1
20 TABLET ORAL DAILY
COMMUNITY
End: 2019-03-21

## 2018-05-24 RX ORDER — SODIUM BICARBONATE 325 MG/1
650 TABLET ORAL 2 TIMES DAILY
COMMUNITY
End: 2019-03-21

## 2018-07-16 DIAGNOSIS — I10 ESSENTIAL HYPERTENSION: ICD-10-CM

## 2018-07-16 DIAGNOSIS — G47.10 HYPERSOMNIA: ICD-10-CM

## 2018-07-16 DIAGNOSIS — R06.83 SNORING: ICD-10-CM

## 2018-07-16 DIAGNOSIS — F79 MENTAL RETARDATION: ICD-10-CM

## 2018-07-16 DIAGNOSIS — G80.4 ATAXIC CEREBRAL PALSY (HCC): ICD-10-CM

## 2018-09-13 LAB
CREATININE, URINE: 25.4
MICROALBUMIN/CREAT 24H UR: 9.1 MG/G{CREAT}
MICROALBUMIN/CREAT UR-RTO: 35.8

## 2018-09-17 ENCOUNTER — OFFICE VISIT (OUTPATIENT)
Dept: NEPHROLOGY | Age: 28
End: 2018-09-17
Payer: COMMERCIAL

## 2018-09-17 VITALS
WEIGHT: 218.4 LBS | BODY MASS INDEX: 35.25 KG/M2 | HEART RATE: 78 BPM | DIASTOLIC BLOOD PRESSURE: 80 MMHG | OXYGEN SATURATION: 99 % | SYSTOLIC BLOOD PRESSURE: 129 MMHG

## 2018-09-17 DIAGNOSIS — Z94.0 KIDNEY TRANSPLANT RECIPIENT: Primary | ICD-10-CM

## 2018-09-17 DIAGNOSIS — I10 ESSENTIAL HYPERTENSION: ICD-10-CM

## 2018-09-17 LAB
BASOPHILS ABSOLUTE: ABNORMAL /ΜL
BASOPHILS RELATIVE PERCENT: ABNORMAL %
BUN BLDV-MCNC: 19 MG/DL
CALCIUM SERPL-MCNC: NORMAL MG/DL
CHLORIDE BLD-SCNC: 107 MMOL/L
CO2: 25 MMOL/L
CREAT SERPL-MCNC: 1.8 MG/DL
EOSINOPHILS ABSOLUTE: ABNORMAL /ΜL
EOSINOPHILS RELATIVE PERCENT: ABNORMAL %
GFR CALCULATED: 48
GLUCOSE BLD-MCNC: 99 MG/DL
HCT VFR BLD CALC: 34.9 % (ref 41–53)
HEMOGLOBIN: 11.2 G/DL (ref 13.5–17.5)
LYMPHOCYTES ABSOLUTE: ABNORMAL /ΜL
LYMPHOCYTES RELATIVE PERCENT: ABNORMAL %
MCH RBC QN AUTO: ABNORMAL PG
MCHC RBC AUTO-ENTMCNC: ABNORMAL G/DL
MCV RBC AUTO: ABNORMAL FL
MONOCYTES ABSOLUTE: ABNORMAL /ΜL
MONOCYTES RELATIVE PERCENT: ABNORMAL %
NEUTROPHILS ABSOLUTE: ABNORMAL /ΜL
NEUTROPHILS RELATIVE PERCENT: ABNORMAL %
PLATELET # BLD: 312 K/ΜL
PMV BLD AUTO: ABNORMAL FL
POTASSIUM SERPL-SCNC: 4.4 MMOL/L
RBC # BLD: 4.06 10^6/ΜL
SODIUM BLD-SCNC: 142 MMOL/L
WBC # BLD: 5.63 10^3/ML

## 2018-09-17 PROCEDURE — G8417 CALC BMI ABV UP PARAM F/U: HCPCS | Performed by: INTERNAL MEDICINE

## 2018-09-17 PROCEDURE — 99213 OFFICE O/P EST LOW 20 MIN: CPT | Performed by: INTERNAL MEDICINE

## 2018-09-17 PROCEDURE — G8427 DOCREV CUR MEDS BY ELIG CLIN: HCPCS | Performed by: INTERNAL MEDICINE

## 2018-09-17 PROCEDURE — 1036F TOBACCO NON-USER: CPT | Performed by: INTERNAL MEDICINE

## 2018-09-17 RX ORDER — ACETAMINOPHEN 325 MG/1
650 TABLET ORAL EVERY 6 HOURS PRN
COMMUNITY

## 2018-10-01 LAB
BASOPHILS ABSOLUTE: ABNORMAL /ΜL
BASOPHILS RELATIVE PERCENT: ABNORMAL %
BUN BLDV-MCNC: 22 MG/DL
CALCIUM SERPL-MCNC: NORMAL MG/DL
CHLORIDE BLD-SCNC: 107 MMOL/L
CO2: 23 MMOL/L
CREAT SERPL-MCNC: 2 MG/DL
EOSINOPHILS ABSOLUTE: ABNORMAL /ΜL
EOSINOPHILS RELATIVE PERCENT: ABNORMAL %
GFR CALCULATED: 42
GLUCOSE BLD-MCNC: 137 MG/DL
HCT VFR BLD CALC: 34.9 % (ref 41–53)
HEMOGLOBIN: 11.3 G/DL (ref 13.5–17.5)
LYMPHOCYTES ABSOLUTE: ABNORMAL /ΜL
LYMPHOCYTES RELATIVE PERCENT: ABNORMAL %
MCH RBC QN AUTO: ABNORMAL PG
MCHC RBC AUTO-ENTMCNC: ABNORMAL G/DL
MCV RBC AUTO: ABNORMAL FL
MONOCYTES ABSOLUTE: ABNORMAL /ΜL
MONOCYTES RELATIVE PERCENT: ABNORMAL %
NEUTROPHILS ABSOLUTE: ABNORMAL /ΜL
NEUTROPHILS RELATIVE PERCENT: ABNORMAL %
PLATELET # BLD: 322 K/ΜL
PMV BLD AUTO: ABNORMAL FL
POTASSIUM SERPL-SCNC: 4.6 MMOL/L
RBC # BLD: 4.11 10^6/ΜL
SODIUM BLD-SCNC: 138 MMOL/L
WBC # BLD: 6.98 10^3/ML

## 2018-10-15 LAB
ALBUMIN SERPL-MCNC: 3.6 G/DL
ALBUMIN: 3.6
ALP BLD-CCNC: 249 U/L
ALT SERPL-CCNC: 32 U/L
ANION GAP SERPL CALCULATED.3IONS-SCNC: NORMAL MMOL/L
AST SERPL-CCNC: 31 U/L
AVERAGE GLUCOSE: NORMAL
BASOPHILS ABSOLUTE: ABNORMAL /ΜL
BASOPHILS RELATIVE PERCENT: ABNORMAL %
BILIRUB SERPL-MCNC: 0.4 MG/DL (ref 0.1–1.4)
BUN BLDV-MCNC: 18 MG/DL
CALCIUM SERPL-MCNC: 9.3 MG/DL
CHLORIDE BLD-SCNC: 105 MMOL/L
CHOLESTEROL, TOTAL: 198 MG/DL
CHOLESTEROL/HDL RATIO: ABNORMAL
CO2: 25 MMOL/L
CREAT SERPL-MCNC: 1.5 MG/DL
EOSINOPHILS ABSOLUTE: ABNORMAL /ΜL
EOSINOPHILS RELATIVE PERCENT: ABNORMAL %
FERRITIN: 55 NG/ML (ref 18–300)
GFR CALCULATED: 59
GLUCOSE BLD-MCNC: 91 MG/DL
HBA1C MFR BLD: 5.5 %
HCT VFR BLD CALC: 35.8 % (ref 41–53)
HDLC SERPL-MCNC: 32 MG/DL (ref 35–70)
HEMOGLOBIN: 11.9 G/DL (ref 13.5–17.5)
IRON: 48
LDL CHOLESTEROL CALCULATED: 120 MG/DL (ref 0–160)
LYMPHOCYTES ABSOLUTE: ABNORMAL /ΜL
LYMPHOCYTES RELATIVE PERCENT: ABNORMAL %
MAGNESIUM: 1.6 MG/DL
MCH RBC QN AUTO: ABNORMAL PG
MCHC RBC AUTO-ENTMCNC: ABNORMAL G/DL
MCV RBC AUTO: ABNORMAL FL
MONOCYTES ABSOLUTE: ABNORMAL /ΜL
MONOCYTES RELATIVE PERCENT: ABNORMAL %
NEUTROPHILS ABSOLUTE: ABNORMAL /ΜL
NEUTROPHILS RELATIVE PERCENT: ABNORMAL %
PHOSPHORUS: 4 MG/DL
PLATELET # BLD: 322 K/ΜL
PMV BLD AUTO: ABNORMAL FL
POTASSIUM SERPL-SCNC: 4.5 MMOL/L
PTH INTACT: 143.2
RBC # BLD: 4.26 10^6/ΜL
SODIUM BLD-SCNC: 138 MMOL/L
TOTAL PROTEIN: NORMAL
TRIGL SERPL-MCNC: 233 MG/DL
URIC ACID: 3.4
VLDLC SERPL CALC-MCNC: 47 MG/DL
WBC # BLD: 6.28 10^3/ML

## 2018-10-30 LAB
BASOPHILS ABSOLUTE: ABNORMAL /ΜL
BASOPHILS RELATIVE PERCENT: ABNORMAL %
BUN BLDV-MCNC: 18 MG/DL
CALCIUM SERPL-MCNC: NORMAL MG/DL
CHLORIDE BLD-SCNC: 107 MMOL/L
CO2: 27 MMOL/L
CREAT SERPL-MCNC: 1.6 MG/DL
EOSINOPHILS ABSOLUTE: ABNORMAL /ΜL
EOSINOPHILS RELATIVE PERCENT: ABNORMAL %
GFR CALCULATED: 54
GLUCOSE BLD-MCNC: 125 MG/DL
HCT VFR BLD CALC: 37 % (ref 41–53)
HEMOGLOBIN: 11.9 G/DL (ref 13.5–17.5)
LYMPHOCYTES ABSOLUTE: ABNORMAL /ΜL
LYMPHOCYTES RELATIVE PERCENT: ABNORMAL %
MCH RBC QN AUTO: ABNORMAL PG
MCHC RBC AUTO-ENTMCNC: ABNORMAL G/DL
MCV RBC AUTO: ABNORMAL FL
MONOCYTES ABSOLUTE: ABNORMAL /ΜL
MONOCYTES RELATIVE PERCENT: ABNORMAL %
NEUTROPHILS ABSOLUTE: ABNORMAL /ΜL
NEUTROPHILS RELATIVE PERCENT: ABNORMAL %
PLATELET # BLD: 297 K/ΜL
PMV BLD AUTO: ABNORMAL FL
POTASSIUM SERPL-SCNC: 4.4 MMOL/L
RBC # BLD: 4.32 10^6/ΜL
SODIUM BLD-SCNC: 141 MMOL/L
WBC # BLD: 5.88 10^3/ML

## 2018-11-12 LAB
ALBUMIN SERPL-MCNC: 3.5 G/DL
ALP BLD-CCNC: 223 U/L
ALT SERPL-CCNC: 37 U/L
ANION GAP SERPL CALCULATED.3IONS-SCNC: NORMAL MMOL/L
AST SERPL-CCNC: 31 U/L
BASOPHILS ABSOLUTE: ABNORMAL /ΜL
BASOPHILS RELATIVE PERCENT: ABNORMAL %
BILIRUB SERPL-MCNC: 0.4 MG/DL (ref 0.1–1.4)
BUN BLDV-MCNC: 15 MG/DL
CALCIUM SERPL-MCNC: 9.2 MG/DL
CHLORIDE BLD-SCNC: 108 MMOL/L
CHOLESTEROL, TOTAL: 167 MG/DL
CHOLESTEROL/HDL RATIO: ABNORMAL
CO2: 24 MMOL/L
CREAT SERPL-MCNC: 1.6 MG/DL
EOSINOPHILS ABSOLUTE: ABNORMAL /ΜL
EOSINOPHILS RELATIVE PERCENT: ABNORMAL %
FERRITIN: 64 NG/ML (ref 18–300)
GFR CALCULATED: 54
GLUCOSE BLD-MCNC: 88 MG/DL
HCT VFR BLD CALC: 37.3 % (ref 41–53)
HDLC SERPL-MCNC: 34 MG/DL (ref 35–70)
HEMOGLOBIN: 12.2 G/DL (ref 13.5–17.5)
IRON: 39
LDL CHOLESTEROL CALCULATED: 95 MG/DL (ref 0–160)
LYMPHOCYTES ABSOLUTE: ABNORMAL /ΜL
LYMPHOCYTES RELATIVE PERCENT: ABNORMAL %
MAGNESIUM: 1.8 MG/DL
MCH RBC QN AUTO: ABNORMAL PG
MCHC RBC AUTO-ENTMCNC: ABNORMAL G/DL
MCV RBC AUTO: ABNORMAL FL
MONOCYTES ABSOLUTE: ABNORMAL /ΜL
MONOCYTES RELATIVE PERCENT: ABNORMAL %
NEUTROPHILS ABSOLUTE: ABNORMAL /ΜL
NEUTROPHILS RELATIVE PERCENT: ABNORMAL %
PHOSPHORUS: 3.5 MG/DL
PLATELET # BLD: 324 K/ΜL
PMV BLD AUTO: ABNORMAL FL
POTASSIUM SERPL-SCNC: 4.6 MMOL/L
RBC # BLD: 4.41 10^6/ΜL
SODIUM BLD-SCNC: 142 MMOL/L
TOTAL IRON BINDING CAPACITY: 226
TOTAL PROTEIN: NORMAL
TRIGL SERPL-MCNC: 190 MG/DL
VLDLC SERPL CALC-MCNC: 38 MG/DL
WBC # BLD: 5.78 10^3/ML

## 2018-11-26 LAB
BASOPHILS ABSOLUTE: ABNORMAL /ΜL
BASOPHILS RELATIVE PERCENT: ABNORMAL %
BUN BLDV-MCNC: 17 MG/DL
CALCIUM SERPL-MCNC: NORMAL MG/DL
CHLORIDE BLD-SCNC: 107 MMOL/L
CO2: 23 MMOL/L
CREAT SERPL-MCNC: 1.8 MG/DL
EOSINOPHILS ABSOLUTE: ABNORMAL /ΜL
EOSINOPHILS RELATIVE PERCENT: ABNORMAL %
GFR CALCULATED: 47
GLUCOSE BLD-MCNC: 138 MG/DL
HCT VFR BLD CALC: 36.2 % (ref 41–53)
HEMOGLOBIN: 11.8 G/DL (ref 13.5–17.5)
LYMPHOCYTES ABSOLUTE: ABNORMAL /ΜL
LYMPHOCYTES RELATIVE PERCENT: ABNORMAL %
MCH RBC QN AUTO: ABNORMAL PG
MCHC RBC AUTO-ENTMCNC: ABNORMAL G/DL
MCV RBC AUTO: ABNORMAL FL
MONOCYTES ABSOLUTE: ABNORMAL /ΜL
MONOCYTES RELATIVE PERCENT: ABNORMAL %
NEUTROPHILS ABSOLUTE: ABNORMAL /ΜL
NEUTROPHILS RELATIVE PERCENT: ABNORMAL %
PLATELET # BLD: 275 K/ΜL
PMV BLD AUTO: ABNORMAL FL
POTASSIUM SERPL-SCNC: 4.3 MMOL/L
RBC # BLD: 4.27 10^6/ΜL
SODIUM BLD-SCNC: 141 MMOL/L
WBC # BLD: 4.97 10^3/ML

## 2018-12-27 LAB
BASOPHILS ABSOLUTE: ABNORMAL /ΜL
BASOPHILS RELATIVE PERCENT: ABNORMAL %
BUN BLDV-MCNC: 16 MG/DL
CALCIUM SERPL-MCNC: NORMAL MG/DL
CHLORIDE BLD-SCNC: 106 MMOL/L
CO2: 27 MMOL/L
CREAT SERPL-MCNC: 1.5 MG/DL
EOSINOPHILS ABSOLUTE: ABNORMAL /ΜL
EOSINOPHILS RELATIVE PERCENT: ABNORMAL %
GFR CALCULATED: 58
GLUCOSE BLD-MCNC: 81 MG/DL
HCT VFR BLD CALC: 38.3 % (ref 41–53)
HEMOGLOBIN: 12.4 G/DL (ref 13.5–17.5)
LYMPHOCYTES ABSOLUTE: ABNORMAL /ΜL
LYMPHOCYTES RELATIVE PERCENT: ABNORMAL %
MCH RBC QN AUTO: ABNORMAL PG
MCHC RBC AUTO-ENTMCNC: ABNORMAL G/DL
MCV RBC AUTO: ABNORMAL FL
MONOCYTES ABSOLUTE: ABNORMAL /ΜL
MONOCYTES RELATIVE PERCENT: ABNORMAL %
NEUTROPHILS ABSOLUTE: ABNORMAL /ΜL
NEUTROPHILS RELATIVE PERCENT: ABNORMAL %
PLATELET # BLD: 323 K/ΜL
PMV BLD AUTO: ABNORMAL FL
POTASSIUM SERPL-SCNC: 4.3 MMOL/L
RBC # BLD: 4.6 10^6/ΜL
SODIUM BLD-SCNC: 142 MMOL/L
WBC # BLD: 6.9 10^3/ML

## 2019-01-23 LAB
ALBUMIN SERPL-MCNC: 3.7 G/DL
ALP BLD-CCNC: 180 U/L
ALT SERPL-CCNC: 52 U/L
ANION GAP SERPL CALCULATED.3IONS-SCNC: 16 MMOL/L
AST SERPL-CCNC: 38 U/L
BASOPHILS ABSOLUTE: 0.03 /ΜL
BASOPHILS RELATIVE PERCENT: 0.3 %
BILIRUB SERPL-MCNC: 0.3 MG/DL (ref 0.1–1.4)
BUN BLDV-MCNC: 12 MG/DL
CALCIUM SERPL-MCNC: 9.5 MG/DL
CHLORIDE BLD-SCNC: 103 MMOL/L
CO2: 26 MMOL/L
CREAT SERPL-MCNC: 1.5 MG/DL
EOSINOPHILS ABSOLUTE: 0.11 /ΜL
EOSINOPHILS RELATIVE PERCENT: 1.1 %
GFR CALCULATED: 58
GLUCOSE BLD-MCNC: 93 MG/DL
HCT VFR BLD CALC: 38.8 % (ref 41–53)
HEMOGLOBIN: 12.7 G/DL (ref 13.5–17.5)
LYMPHOCYTES ABSOLUTE: 0.48 /ΜL
LYMPHOCYTES RELATIVE PERCENT: 4.7 %
MAGNESIUM: 2 MG/DL
MCH RBC QN AUTO: ABNORMAL PG
MCHC RBC AUTO-ENTMCNC: ABNORMAL G/DL
MCV RBC AUTO: ABNORMAL FL
MONOCYTES ABSOLUTE: 0.54 /ΜL
MONOCYTES RELATIVE PERCENT: 5.3 %
NEUTROPHILS ABSOLUTE: 8.98 /ΜL
NEUTROPHILS RELATIVE PERCENT: 88.4 %
PHOSPHORUS: 3.5 MG/DL
PLATELET # BLD: 375 K/ΜL
PMV BLD AUTO: ABNORMAL FL
POTASSIUM SERPL-SCNC: 4 MMOL/L
PTH INTACT: 72.1
RBC # BLD: ABNORMAL 10^6/ΜL
SODIUM BLD-SCNC: 141 MMOL/L
TOTAL PROTEIN: NORMAL
URIC ACID: 3.8
WBC # BLD: 10.16 10^3/ML

## 2019-02-14 ENCOUNTER — OFFICE VISIT (OUTPATIENT)
Dept: PULMONOLOGY | Age: 29
End: 2019-02-14
Payer: COMMERCIAL

## 2019-02-14 VITALS
DIASTOLIC BLOOD PRESSURE: 76 MMHG | SYSTOLIC BLOOD PRESSURE: 130 MMHG | HEIGHT: 66 IN | WEIGHT: 212 LBS | OXYGEN SATURATION: 95 % | BODY MASS INDEX: 34.07 KG/M2 | HEART RATE: 75 BPM

## 2019-02-14 DIAGNOSIS — G47.10 HYPERSOMNIA: ICD-10-CM

## 2019-02-14 DIAGNOSIS — G47.33 OSA ON CPAP: Primary | ICD-10-CM

## 2019-02-14 DIAGNOSIS — Z99.89 OSA ON CPAP: Primary | ICD-10-CM

## 2019-02-14 PROCEDURE — 1036F TOBACCO NON-USER: CPT | Performed by: INTERNAL MEDICINE

## 2019-02-14 PROCEDURE — G8417 CALC BMI ABV UP PARAM F/U: HCPCS | Performed by: INTERNAL MEDICINE

## 2019-02-14 PROCEDURE — G8484 FLU IMMUNIZE NO ADMIN: HCPCS | Performed by: INTERNAL MEDICINE

## 2019-02-14 PROCEDURE — 99213 OFFICE O/P EST LOW 20 MIN: CPT | Performed by: INTERNAL MEDICINE

## 2019-02-14 PROCEDURE — G8427 DOCREV CUR MEDS BY ELIG CLIN: HCPCS | Performed by: INTERNAL MEDICINE

## 2019-02-14 RX ORDER — MULTIVIT-MIN/IRON/FOLIC ACID/K 18-600-40
CAPSULE ORAL
COMMUNITY

## 2019-02-14 RX ORDER — AMOXICILLIN AND CLAVULANATE POTASSIUM 875; 125 MG/1; MG/1
1 TABLET, FILM COATED ORAL 2 TIMES DAILY
COMMUNITY
End: 2019-03-21 | Stop reason: ALTCHOICE

## 2019-02-26 LAB
BASOPHILS ABSOLUTE: 0.02 /ΜL
BASOPHILS RELATIVE PERCENT: 0.3 %
BUN BLDV-MCNC: 15 MG/DL
CALCIUM SERPL-MCNC: NORMAL MG/DL
CHLORIDE BLD-SCNC: 108 MMOL/L
CO2: 29 MMOL/L
CREAT SERPL-MCNC: 1.4 MG/DL
EOSINOPHILS ABSOLUTE: 0.13 /ΜL
EOSINOPHILS RELATIVE PERCENT: 2.1 %
GFR CALCULATED: NORMAL
GLUCOSE BLD-MCNC: 103 MG/DL
HCT VFR BLD CALC: 40.6 % (ref 41–53)
HEMOGLOBIN: 12.9 G/DL (ref 13.5–17.5)
LYMPHOCYTES ABSOLUTE: 0.75 /ΜL
LYMPHOCYTES RELATIVE PERCENT: 12.4 %
MCH RBC QN AUTO: ABNORMAL PG
MCHC RBC AUTO-ENTMCNC: ABNORMAL G/DL
MCV RBC AUTO: ABNORMAL FL
MONOCYTES ABSOLUTE: 0.51 /ΜL
MONOCYTES RELATIVE PERCENT: 8.4 %
NEUTROPHILS ABSOLUTE: 4.64 /ΜL
NEUTROPHILS RELATIVE PERCENT: 76.5 %
PLATELET # BLD: 305 K/ΜL
PMV BLD AUTO: ABNORMAL FL
POTASSIUM SERPL-SCNC: 4.3 MMOL/L
RBC # BLD: ABNORMAL 10^6/ΜL
SODIUM BLD-SCNC: 144 MMOL/L
WBC # BLD: 6.07 10^3/ML

## 2019-03-21 ENCOUNTER — TELEPHONE (OUTPATIENT)
Dept: NEPHROLOGY | Age: 29
End: 2019-03-21

## 2019-03-21 ENCOUNTER — OFFICE VISIT (OUTPATIENT)
Dept: NEPHROLOGY | Age: 29
End: 2019-03-21
Payer: COMMERCIAL

## 2019-03-21 VITALS
OXYGEN SATURATION: 97 % | SYSTOLIC BLOOD PRESSURE: 117 MMHG | DIASTOLIC BLOOD PRESSURE: 74 MMHG | HEART RATE: 73 BPM | BODY MASS INDEX: 33.96 KG/M2 | WEIGHT: 210.4 LBS

## 2019-03-21 DIAGNOSIS — I10 ESSENTIAL HYPERTENSION: ICD-10-CM

## 2019-03-21 DIAGNOSIS — Z94.0 KIDNEY TRANSPLANT RECIPIENT: Primary | ICD-10-CM

## 2019-03-21 PROCEDURE — G8427 DOCREV CUR MEDS BY ELIG CLIN: HCPCS | Performed by: INTERNAL MEDICINE

## 2019-03-21 PROCEDURE — 1036F TOBACCO NON-USER: CPT | Performed by: INTERNAL MEDICINE

## 2019-03-21 PROCEDURE — 99213 OFFICE O/P EST LOW 20 MIN: CPT | Performed by: INTERNAL MEDICINE

## 2019-03-21 PROCEDURE — G8417 CALC BMI ABV UP PARAM F/U: HCPCS | Performed by: INTERNAL MEDICINE

## 2019-03-21 PROCEDURE — G8484 FLU IMMUNIZE NO ADMIN: HCPCS | Performed by: INTERNAL MEDICINE

## 2019-03-21 RX ORDER — ALLOPURINOL 300 MG/1
300 TABLET ORAL DAILY
Qty: 90 TABLET | Refills: 3 | Status: SHIPPED | OUTPATIENT
Start: 2019-03-21 | End: 2020-03-23 | Stop reason: SDUPTHER

## 2019-03-21 RX ORDER — FUROSEMIDE 20 MG/1
20 TABLET ORAL DAILY
Qty: 90 TABLET | Refills: 3 | Status: SHIPPED | OUTPATIENT
Start: 2019-03-21

## 2019-03-21 RX ORDER — AMLODIPINE BESYLATE 5 MG/1
5 TABLET ORAL DAILY
Qty: 90 TABLET | Refills: 3 | Status: SHIPPED | OUTPATIENT
Start: 2019-03-21

## 2019-03-21 RX ORDER — CALCIUM POLYCARBOPHIL 625 MG 625 MG/1
625 TABLET ORAL DAILY
COMMUNITY

## 2019-03-27 LAB
BASOPHILS ABSOLUTE: ABNORMAL /ΜL
BASOPHILS RELATIVE PERCENT: ABNORMAL %
BUN BLDV-MCNC: 18 MG/DL
CALCIUM SERPL-MCNC: NORMAL MG/DL
CHLORIDE BLD-SCNC: 107 MMOL/L
CO2: 27 MMOL/L
CREAT SERPL-MCNC: 1.3 MG/DL
EOSINOPHILS ABSOLUTE: ABNORMAL /ΜL
EOSINOPHILS RELATIVE PERCENT: ABNORMAL %
GFR CALCULATED: 69
GLUCOSE BLD-MCNC: 118 MG/DL
HCT VFR BLD CALC: 41.4 % (ref 41–53)
HEMOGLOBIN: 13.3 G/DL (ref 13.5–17.5)
LYMPHOCYTES ABSOLUTE: ABNORMAL /ΜL
LYMPHOCYTES RELATIVE PERCENT: ABNORMAL %
MCH RBC QN AUTO: ABNORMAL PG
MCHC RBC AUTO-ENTMCNC: ABNORMAL G/DL
MCV RBC AUTO: ABNORMAL FL
MONOCYTES ABSOLUTE: ABNORMAL /ΜL
MONOCYTES RELATIVE PERCENT: ABNORMAL %
NEUTROPHILS ABSOLUTE: ABNORMAL /ΜL
NEUTROPHILS RELATIVE PERCENT: ABNORMAL %
PLATELET # BLD: 307 K/ΜL
PMV BLD AUTO: ABNORMAL FL
POTASSIUM SERPL-SCNC: 4.3 MMOL/L
PTH INTACT: 61.9
RBC # BLD: 5.02 10^6/ΜL
SODIUM BLD-SCNC: 141 MMOL/L
WBC # BLD: 6.48 10^3/ML

## 2019-08-01 NOTE — PROGRESS NOTES
[facility] [address]                                 Sleep medicine clinic follow note. Rhonda Aragon        Chief Complaint: Loyd Vasquez is here for a 6 month FARIHA follow up with a download. Chief complaint and [place]: Rhonda Aragon is a 25 y. o.oldmale came for follow up regarding his sleep apnea. He is currently using his positive airway pressure device with an auto CPAP with minimum pressure of 8cm H20 and maximum pressure of 14cm H20. He denies any problems with machine or mask. He could not able to tolerate fixed CPAP prssure in the past. He is currently using nasal pillows with out any difficulty. He is sleeping well at night with out difficulty. He denies any daytime sleepiness. Review of Systems:   General/Constitutional: he lost 6lbs of weight from the last visit with normal appetite. No fever or chills. HENT: Negative. Eyes: Negative. Upper respiratory tract: No nasal stuffiness or post nasal drip. Lower respiratory tract/ lungs: No cough or sputum production. No hemoptysis. Cardiovascular: No palpitations or chest pain. Gastrointestinal: No nausea or vomiting. Neurological: No focal neurologiacal weakness. Extremities: No edema. Musculoskeletal: No complaints. Genitourinary: No complaints. Hematological: Negative. Psychiatric/Behavioral: Negative. Skin: No itching. Past Medical History:   Diagnosis Date    Anemia in chronic kidney disease(285.21)     Bowel obstruction (HCC) 1988    Cerebral palsy (HCC)     Chronic kidney disease, stage IV (severe) (HCC)     if develops ESRD would do peritoneal dialysis and possible kidney transplant.     Developmental disorder     Hard of hearing     Hypertension     Hypothyroidism     Mentally disabled     Pre-transplant evaluation for CKD (chronic kidney disease) 04/20/2017    mailed to [facility] Kid[facility]    Proteinuria        Past Surgical History: Procedure Laterality Date    ABDOMEN SURGERY      bowel obstruction and repair-1988, g tube placed and removed age 1   [facility] ADENOIDECTOMY      EYE SURGERY  age 1    bilateral eyes    TYMPANOSTOMY TUBE PLACEMENT      multiple up to age 4       Social History     Tobacco Use    Smoking status: Never Smoker    Smokeless tobacco: Never Used   Substance Use Topics    Alcohol use: No    Drug use: No       Allergies   Allergen Reactions    Ceclor [Cefaclor]     Lisinopril      Should avoid taking r/t potassium issues, not true allergy    Tegretol [Carbamazepine]        Current Outpatient Medications   Medication Sig Dispense Refill    Sodium Chloride-Sodium Bicarb (AYR SALINE NASAL RINSE NA) by Nasal route daily      polycarbophil (FIBERCON) 625 MG tablet Take 625 mg by mouth 2 times daily      allopurinol (ZYLOPRIM) 300 MG tablet Take 1 tablet by mouth daily 90 tablet 3    amLODIPine (NORVASC) 5 MG tablet Take 1 tablet by mouth daily 90 tablet 3    furosemide (LASIX) 20 MG tablet Take 1 tablet by mouth daily 90 tablet 3    Mycophenolate Sodium 360 MG TBEC Take 720 mg by mouth 2 times daily       Cholecalciferol (VITAMIN D) 2000 units CAPS capsule Take by mouth      CPAP Machine MISC by Does not apply route Please change his current Auto CPAP pressure settings to an auto CPAP with minimum pressure of 8 cm H20 and maximum pressure of 14 cm H20. 1 each 0    acetaminophen (TYLENOL) 325 MG tablet Take 650 mg by mouth every 6 hours as needed for Pain      ferrous sulfate 325 (65 Fe) MG tablet Take 325 mg by mouth daily (with breakfast)      labetalol (NORMODYNE) 100 MG tablet Take 100 mg by mouth 3 times daily       pantoprazole (PROTONIX) 40 MG tablet Take 40 mg by mouth      Everolimus 0.5 MG TABS Take 0.5 mg by mouth 1 mg in am and 0.5 mg in pm      lamoTRIgine (LAMICTAL) 25 MG tablet Take 25 mg by mouth 2 times daily       risperiDONE (RISPERDAL) 0.5 MG tablet Take 2 tablets by mouth every evening 60

## 2019-08-15 ENCOUNTER — OFFICE VISIT (OUTPATIENT)
Dept: PULMONOLOGY | Age: 29
End: 2019-08-15
Payer: COMMERCIAL

## 2019-08-15 VITALS
SYSTOLIC BLOOD PRESSURE: 110 MMHG | DIASTOLIC BLOOD PRESSURE: 70 MMHG | HEART RATE: 70 BPM | BODY MASS INDEX: 33.11 KG/M2 | OXYGEN SATURATION: 97 % | HEIGHT: 66 IN | WEIGHT: 206 LBS

## 2019-08-15 DIAGNOSIS — Z99.89 OSA ON CPAP: Primary | ICD-10-CM

## 2019-08-15 DIAGNOSIS — G47.33 OSA ON CPAP: Primary | ICD-10-CM

## 2019-08-15 PROCEDURE — G8417 CALC BMI ABV UP PARAM F/U: HCPCS | Performed by: INTERNAL MEDICINE

## 2019-08-15 PROCEDURE — 1036F TOBACCO NON-USER: CPT | Performed by: INTERNAL MEDICINE

## 2019-08-15 PROCEDURE — 99213 OFFICE O/P EST LOW 20 MIN: CPT | Performed by: INTERNAL MEDICINE

## 2019-08-15 PROCEDURE — G8427 DOCREV CUR MEDS BY ELIG CLIN: HCPCS | Performed by: INTERNAL MEDICINE

## 2019-09-17 LAB
ALBUMIN SERPL-MCNC: 3.7 G/DL
ALP BLD-CCNC: 105 U/L
ALT SERPL-CCNC: 38 U/L
ANION GAP SERPL CALCULATED.3IONS-SCNC: NORMAL MMOL/L
AST SERPL-CCNC: 34 U/L
BASOPHILS ABSOLUTE: NORMAL
BASOPHILS RELATIVE PERCENT: NORMAL
BILIRUB SERPL-MCNC: 0.5 MG/DL (ref 0.1–1.4)
BUN BLDV-MCNC: 16 MG/DL
CALCIUM SERPL-MCNC: 9.4 MG/DL
CHLORIDE BLD-SCNC: 106 MMOL/L
CHOLESTEROL, TOTAL: 150 MG/DL
CHOLESTEROL/HDL RATIO: NORMAL
CO2: 24 MMOL/L
CREAT SERPL-MCNC: 1.4 MG/DL
EOSINOPHILS ABSOLUTE: NORMAL
EOSINOPHILS RELATIVE PERCENT: NORMAL
FERRITIN: 93 NG/ML (ref 18–300)
GFR CALCULATED: 63
GLUCOSE BLD-MCNC: 95 MG/DL
HCT VFR BLD CALC: 42.7 % (ref 41–53)
HDLC SERPL-MCNC: 38 MG/DL (ref 35–70)
HEMOGLOBIN: 14 G/DL (ref 13.5–17.5)
IRON: 38
LDL CHOLESTEROL CALCULATED: 88 MG/DL (ref 0–160)
LYMPHOCYTES ABSOLUTE: NORMAL
LYMPHOCYTES RELATIVE PERCENT: NORMAL
MCH RBC QN AUTO: NORMAL PG
MCHC RBC AUTO-ENTMCNC: NORMAL G/DL
MCV RBC AUTO: NORMAL FL
MONOCYTES ABSOLUTE: NORMAL
MONOCYTES RELATIVE PERCENT: NORMAL
NEUTROPHILS ABSOLUTE: NORMAL
NEUTROPHILS RELATIVE PERCENT: NORMAL
PLATELET # BLD: 322 K/ΜL
PMV BLD AUTO: NORMAL FL
POTASSIUM SERPL-SCNC: 3.9 MMOL/L
PTH INTACT: 64.9
RBC # BLD: 5.11 10^6/ΜL
SODIUM BLD-SCNC: 141 MMOL/L
TOTAL IRON BINDING CAPACITY: 219
TOTAL PROTEIN: NORMAL
TRIGL SERPL-MCNC: 121 MG/DL
VLDLC SERPL CALC-MCNC: 24 MG/DL
WBC # BLD: 8.52 10^3/ML

## 2019-09-23 LAB — CREATINE 24 HOUR URINE: 1.1

## 2019-10-14 LAB
BASOPHILS ABSOLUTE: NORMAL
BASOPHILS RELATIVE PERCENT: NORMAL
BUN BLDV-MCNC: 16 MG/DL
CALCIUM SERPL-MCNC: NORMAL MG/DL
CHLORIDE BLD-SCNC: 108 MMOL/L
CO2: 26 MMOL/L
CREAT SERPL-MCNC: 1.4 MG/DL
EOSINOPHILS ABSOLUTE: NORMAL
EOSINOPHILS RELATIVE PERCENT: NORMAL
GFR CALCULATED: 63
GLUCOSE BLD-MCNC: 89 MG/DL
HCT VFR BLD CALC: 42.2 % (ref 41–53)
HEMOGLOBIN: 13.5 G/DL (ref 13.5–17.5)
LYMPHOCYTES ABSOLUTE: NORMAL
LYMPHOCYTES RELATIVE PERCENT: NORMAL
MCH RBC QN AUTO: NORMAL PG
MCHC RBC AUTO-ENTMCNC: NORMAL G/DL
MCV RBC AUTO: NORMAL FL
MONOCYTES ABSOLUTE: NORMAL
MONOCYTES RELATIVE PERCENT: NORMAL
NEUTROPHILS ABSOLUTE: NORMAL
NEUTROPHILS RELATIVE PERCENT: NORMAL
PLATELET # BLD: 319 K/ΜL
PMV BLD AUTO: NORMAL FL
POTASSIUM SERPL-SCNC: 4.2 MMOL/L
PTH INTACT: 77.5
RBC # BLD: 5.04 10^6/ΜL
SODIUM BLD-SCNC: 143 MMOL/L
WBC # BLD: 6.81 10^3/ML

## 2019-11-14 LAB
BASOPHILS ABSOLUTE: NORMAL
BASOPHILS RELATIVE PERCENT: NORMAL
BUN BLDV-MCNC: 12 MG/DL
CALCIUM SERPL-MCNC: NORMAL MG/DL
CHLORIDE BLD-SCNC: 105 MMOL/L
CO2: 26 MMOL/L
CREAT SERPL-MCNC: 1.4 MG/DL
EOSINOPHILS ABSOLUTE: NORMAL
EOSINOPHILS RELATIVE PERCENT: NORMAL
GFR CALCULATED: NORMAL
GLUCOSE BLD-MCNC: 89 MG/DL
HCT VFR BLD CALC: 42.3 % (ref 41–53)
HEMOGLOBIN: 13.7 G/DL (ref 13.5–17.5)
LYMPHOCYTES ABSOLUTE: NORMAL
LYMPHOCYTES RELATIVE PERCENT: NORMAL
MCH RBC QN AUTO: NORMAL PG
MCHC RBC AUTO-ENTMCNC: NORMAL G/DL
MCV RBC AUTO: NORMAL FL
MONOCYTES ABSOLUTE: NORMAL
MONOCYTES RELATIVE PERCENT: NORMAL
NEUTROPHILS ABSOLUTE: NORMAL
NEUTROPHILS RELATIVE PERCENT: NORMAL
PLATELET # BLD: 315 K/ΜL
PMV BLD AUTO: NORMAL FL
POTASSIUM SERPL-SCNC: 4.1 MMOL/L
PTH INTACT: 73.7
RBC # BLD: 5.08 10^6/ΜL
SODIUM BLD-SCNC: 140 MMOL/L
URIC ACID: 4.4
WBC # BLD: 6.92 10^3/ML

## 2019-12-10 LAB
BASOPHILS ABSOLUTE: NORMAL
BASOPHILS RELATIVE PERCENT: NORMAL
BUN BLDV-MCNC: 17 MG/DL
CALCIUM SERPL-MCNC: NORMAL MG/DL
CHLORIDE BLD-SCNC: NORMAL MMOL/L
CO2: 23 MMOL/L
CREAT SERPL-MCNC: 1.3 MG/DL
EOSINOPHILS ABSOLUTE: NORMAL
EOSINOPHILS RELATIVE PERCENT: NORMAL
GFR CALCULATED: 68
GLUCOSE BLD-MCNC: 95 MG/DL
HCT VFR BLD CALC: 42.6 % (ref 41–53)
HEMOGLOBIN: 13.6 G/DL (ref 13.5–17.5)
LYMPHOCYTES ABSOLUTE: NORMAL
LYMPHOCYTES RELATIVE PERCENT: NORMAL
MCH RBC QN AUTO: NORMAL PG
MCHC RBC AUTO-ENTMCNC: NORMAL G/DL
MCV RBC AUTO: NORMAL FL
MONOCYTES ABSOLUTE: NORMAL
MONOCYTES RELATIVE PERCENT: NORMAL
NEUTROPHILS ABSOLUTE: NORMAL
NEUTROPHILS RELATIVE PERCENT: NORMAL
PLATELET # BLD: 325 K/ΜL
PMV BLD AUTO: NORMAL FL
POTASSIUM SERPL-SCNC: 4.2 MMOL/L
PTH INTACT: 89.9
RBC # BLD: 5.06 10^6/ΜL
SODIUM BLD-SCNC: 144 MMOL/L
WBC # BLD: 7.41 10^3/ML

## 2020-01-02 ENCOUNTER — OFFICE VISIT (OUTPATIENT)
Dept: NEPHROLOGY | Age: 30
End: 2020-01-02
Payer: COMMERCIAL

## 2020-01-02 VITALS
SYSTOLIC BLOOD PRESSURE: 132 MMHG | HEART RATE: 65 BPM | BODY MASS INDEX: 34.41 KG/M2 | WEIGHT: 213.2 LBS | OXYGEN SATURATION: 97 % | DIASTOLIC BLOOD PRESSURE: 78 MMHG

## 2020-01-02 PROCEDURE — 99213 OFFICE O/P EST LOW 20 MIN: CPT | Performed by: INTERNAL MEDICINE

## 2020-01-02 PROCEDURE — G8484 FLU IMMUNIZE NO ADMIN: HCPCS | Performed by: INTERNAL MEDICINE

## 2020-01-02 PROCEDURE — G8417 CALC BMI ABV UP PARAM F/U: HCPCS | Performed by: INTERNAL MEDICINE

## 2020-01-02 PROCEDURE — G8427 DOCREV CUR MEDS BY ELIG CLIN: HCPCS | Performed by: INTERNAL MEDICINE

## 2020-01-02 PROCEDURE — 1036F TOBACCO NON-USER: CPT | Performed by: INTERNAL MEDICINE

## 2020-01-02 RX ORDER — ASPIRIN 81 MG/1
81 TABLET, CHEWABLE ORAL DAILY
COMMUNITY

## 2020-01-02 NOTE — PROGRESS NOTES
Radiology and Tests    Labs -    9/18 3/19 11/19         Potassium 4.4 4.3 4.2         BUN 19 15 17         Creatinine 1.8 1.4 1.3         eGFR 48  68                     UPCR            CR                          Renal Ultrasound Scan        Assessment    1. Renal - chronic kidney disease stage III with a recent history of kidney transplant 12/2017  - GFR is much better at this time after stopping gengraf  - He is currently on mycophenolate  540 BID and also zotress 1 mg BID  2. essential hypertension-running well continue current medications. 3. Chronic sinusitis - resolved . 4. Gout on allopurinol  5. Meds reviewed and D/W patient  6. FU in 6 month    Tests and orders placed this Encounter     Orders Placed This Encounter   Procedures    Basic Metabolic Panel    Microalbumin / Creatinine Urine Ratio    Protein / creatinine ratio, urine    Urinalysis with Microscopic       Nicolasa Montalvo M.D  [facility].

## 2020-01-27 NOTE — PROGRESS NOTES
Laterality Date    ABDOMEN SURGERY      bowel obstruction and repair-1988, g tube placed and removed age 1    ADENOIDECTOMY      EYE SURGERY  age 1    bilateral eyes    TYMPANOSTOMY TUBE PLACEMENT      multiple up to age 4       Social History     Tobacco Use    Smoking status: Never Smoker    Smokeless tobacco: Never Used   Substance Use Topics    Alcohol use: No    Drug use: No       Allergies   Allergen Reactions    Ceclor [Cefaclor]     Lisinopril      Should avoid taking r/t potassium issues, not true allergy    Tegretol [Carbamazepine]        Current Outpatient Medications   Medication Sig Dispense Refill    aspirin 81 MG chewable tablet Take 81 mg by mouth daily      Sodium Chloride-Sodium Bicarb (AYR SALINE NASAL RINSE NA) by Nasal route daily      CPAP Machine MISC by Does not apply route Please change his current Auto CPAP pressure settings to an auto CPAP with minimum pressure of 10cm H20 and maximum pressure of 16 cm H20. 1 each 0    polycarbophil (FIBERCON) 625 MG tablet Take 625 mg by mouth 2 times daily      allopurinol (ZYLOPRIM) 300 MG tablet Take 1 tablet by mouth daily 90 tablet 3    amLODIPine (NORVASC) 5 MG tablet Take 1 tablet by mouth daily 90 tablet 3    furosemide (LASIX) 20 MG tablet Take 1 tablet by mouth daily 90 tablet 3    MYCOPHENOLATE SODIUM PO Take 540 mg by mouth 2 times daily       Cholecalciferol (VITAMIN D) 2000 units CAPS capsule Take by mouth      CPAP Machine MISC by Does not apply route Please change his current Auto CPAP pressure settings to an auto CPAP with minimum pressure of 8 cm H20 and maximum pressure of 14 cm H20. 1 each 0    acetaminophen (TYLENOL) 325 MG tablet Take 650 mg by mouth every 6 hours as needed for Pain      ferrous sulfate 325 (65 Fe) MG tablet Take 325 mg by mouth 3 times daily (with meals)       labetalol (NORMODYNE) 100 MG tablet Take 100 mg by mouth 3 times daily       pantoprazole (PROTONIX) 40 MG tablet Take 40 mg by mouth  Everolimus 0.5 MG TABS Take 0.5 mg by mouth 2 tablets every 12 hours      lamoTRIgine (LAMICTAL) 100 MG tablet Take 100 mg by mouth 2 times daily       risperiDONE (RISPERDAL) 1 MG tablet Take 1 mg by mouth every evening  60 tablet 2    LORazepam (ATIVAN) 1 MG tablet Take 1 tablet by mouth 3 times daily as needed      NASONEX 50 MCG/ACT nasal spray USE TWO SPRAY(S) IN EACH NOSTRIL ONCE DAILY 1 Inhaler 3    levothyroxine (SYNTHROID) 88 MCG tablet Take 88 mcg by mouth daily       fexofenadine (ALLEGRA) 60 MG tablet Take 180 mg by mouth daily. No current facility-administered medications for this visit. Family History   Problem Relation Age of Onset    Hypertension Mother     Diabetes Mother     Cancer Father     Kidney Disease Maternal Grandmother           /78 (Site: Left Upper Arm, Position: Sitting, Cuff Size: Medium Adult)   Pulse 64   Ht 5' 6\" (1.676 m)   Wt 216 lb 12.8 oz (98.3 kg)   SpO2 99% Comment: on room air at rest  BMI 34.99 kg/m²   BMI:  Body mass index is 34.99 kg/m². Mallampati airway Class:IV  Neck Circumference:. 16.5  Inches  [place] sleepiness score 2/20/20: 4. Physical Exam :  Nursing note and vitals reviewed. Constitutional: Patient appears moderately built and moderately nourished. No distress. Patient is oriented to person, place, and time. HENT:   Head: Normocephalic and atraumatic. Right Ear: External ear normal. Hearing aid in place. Left Ear: External ear normal. Hearing aid in place. Mouth/Throat: Oropharynx is clear and moist.  No oral thrush. Eyes: Conjunctivae are normal. Pupils are equal, round, and reactive to light. No scleral icterus. Neck: Neck supple. No JVD present. No tracheal deviation present. Cardiovascular: Normal rate, regular rhythm, normal heart sounds. No murmur heard. Pulmonary/Chest: Effort normal and breath sounds normal. No stridor. No respiratory distress. No wheezes. No rales.  Patient exhibits no tenderness. Abdominal: Soft. Patient exhibits no distension. No tenderness. Musculoskeletal: Normal range of motion. Extremities: Patient exhibits no edema and no tenderness. Lymphadenopathy:  No cervical adenopathy. Neurological: Patient is alert and oriented to person, place, and time. Skin: Skin is warm and dry. Patient is not diaphoretic. Psychiatric: Patient  has a normal mood and affect. Patient behavior is normal.       Diagnostic Data: PS18   AHI: 7.5  PAP Download:   Recorded compliance dates: 20-20  More than 4hour usage compliance was:100%. Average residual Apnea- Hypoapnea index on current pressue was:1.3.       PAP Type Auto PAP   Level  10/16 cmH20      Average usuage hours per day was:7:49        Interface: Nasal Pillows     Provider:  []?[facility]             []?[facility]                        []?[employer]          [x]? [facility]                           []?[facility]      []? Other:     95th percentile leak : 16.4L/min  95th percentile pressure: 11.8cm H20       Assesment:  -Mild obstructive sleep apnea on treatment with an auto CPAP with minimum pressure of 10cm H20 and maximum pressure of 16cm H20. He denies any problems with machine or mask- he is using his CPAP device with excellent compliance to > hours of therapy. His respiratory events were under control. He is benefiting from current CPAP therapy and would like to continue the therapy.  -Hypersomnia most likely due to obstructive sleep apnea- Improved  -Cerebral palsy ([address] -Chronic kidney disease, stage IV (severe) ([facility]) S/p Kidney transplant.  -Hypertension under control  -Hypothyroidism.  -Mentally disabled.  -hx of seizures in the past I.e during his child bear. He is currently following with a Neurologist Dr. Dede Hoyos at [facility]. He is currently on Lamictal.  -Tremors. Recommendations/Plan:  -He was advised to continue current positive airway pressure therapy with above described pressure.  He not able to tolerate

## 2020-02-11 LAB
ALBUMIN SERPL-MCNC: 3.5 G/DL
ALP BLD-CCNC: 99 U/L
ALT SERPL-CCNC: 57 U/L
ANION GAP SERPL CALCULATED.3IONS-SCNC: 13 MMOL/L
AST SERPL-CCNC: 30 U/L
BASOPHILS ABSOLUTE: ABNORMAL
BASOPHILS RELATIVE PERCENT: ABNORMAL
BILIRUB SERPL-MCNC: 0.4 MG/DL (ref 0.1–1.4)
BUN BLDV-MCNC: 15 MG/DL
CALCIUM SERPL-MCNC: 8.9 MG/DL
CHLORIDE BLD-SCNC: 106 MMOL/L
CHOLESTEROL, TOTAL: 169 MG/DL
CHOLESTEROL/HDL RATIO: ABNORMAL
CO2: 26 MMOL/L
CREAT SERPL-MCNC: 1.3 MG/DL
EOSINOPHILS ABSOLUTE: ABNORMAL
EOSINOPHILS RELATIVE PERCENT: ABNORMAL
FERRITIN: 111 NG/ML (ref 18–300)
GFR CALCULATED: 68
GLUCOSE BLD-MCNC: 109 MG/DL
HCT VFR BLD CALC: 41.6 % (ref 41–53)
HDLC SERPL-MCNC: 33 MG/DL (ref 35–70)
HEMOGLOBIN: 13.4 G/DL (ref 13.5–17.5)
IRON SATURATION: 20
IRON: 41
LDL CHOLESTEROL CALCULATED: 105 MG/DL (ref 0–160)
LYMPHOCYTES ABSOLUTE: ABNORMAL
LYMPHOCYTES RELATIVE PERCENT: ABNORMAL
MCH RBC QN AUTO: ABNORMAL PG
MCHC RBC AUTO-ENTMCNC: ABNORMAL G/DL
MCV RBC AUTO: ABNORMAL FL
MONOCYTES ABSOLUTE: ABNORMAL
MONOCYTES RELATIVE PERCENT: ABNORMAL
NEUTROPHILS ABSOLUTE: ABNORMAL
NEUTROPHILS RELATIVE PERCENT: ABNORMAL
PLATELET # BLD: 279 K/ΜL
PMV BLD AUTO: ABNORMAL FL
POTASSIUM SERPL-SCNC: 4.2 MMOL/L
PTH INTACT: 67.9
RBC # BLD: 4.97 10^6/ΜL
SODIUM BLD-SCNC: 141 MMOL/L
TOTAL IRON BINDING CAPACITY: 199
TOTAL PROTEIN: NORMAL
TRIGL SERPL-MCNC: 191 MG/DL
URIC ACID: 3.9
VLDLC SERPL CALC-MCNC: ABNORMAL MG/DL
WBC # BLD: 6.77 10^3/ML

## 2020-02-20 ENCOUNTER — OFFICE VISIT (OUTPATIENT)
Dept: PULMONOLOGY | Age: 30
End: 2020-02-20
Payer: COMMERCIAL

## 2020-02-20 VITALS
DIASTOLIC BLOOD PRESSURE: 78 MMHG | SYSTOLIC BLOOD PRESSURE: 128 MMHG | OXYGEN SATURATION: 99 % | WEIGHT: 216.8 LBS | HEART RATE: 64 BPM | BODY MASS INDEX: 34.84 KG/M2 | HEIGHT: 66 IN

## 2020-02-20 PROCEDURE — 99213 OFFICE O/P EST LOW 20 MIN: CPT | Performed by: INTERNAL MEDICINE

## 2020-02-20 PROCEDURE — G8427 DOCREV CUR MEDS BY ELIG CLIN: HCPCS | Performed by: INTERNAL MEDICINE

## 2020-02-20 PROCEDURE — 1036F TOBACCO NON-USER: CPT | Performed by: INTERNAL MEDICINE

## 2020-02-20 PROCEDURE — G8417 CALC BMI ABV UP PARAM F/U: HCPCS | Performed by: INTERNAL MEDICINE

## 2020-02-20 PROCEDURE — G8484 FLU IMMUNIZE NO ADMIN: HCPCS | Performed by: INTERNAL MEDICINE

## 2020-02-20 NOTE — PROGRESS NOTES
Chief Complaint: Lacey Otero is here for a 6 month FARIHA follow up with a download    Mallampati airway Class:IV  Neck Circumference:. 16.5  Inches    [place] sleepiness score 2/20/20: 4. Diagnostic Data: 7/11/18   AHI: 7.5  PAP Download:   Recorded compliance dates: 1/20/20-2/18/20  More than 4hour usage compliance was:100%. Average residual Apnea- Hypoapnea index on current pressue was:1.3.       PAP Type Auto PAP   Level  10/16 cmH20     Average usuage hours per day was:7:49     Interface: Nasal Pillows    Provider:  [][facility]  [][facility]  [][employer]  [x][facility]         [][facility] []Other:

## 2020-03-23 RX ORDER — ALLOPURINOL 300 MG/1
300 TABLET ORAL DAILY
Qty: 90 TABLET | Refills: 3 | Status: SHIPPED | OUTPATIENT
Start: 2020-03-23

## 2020-04-08 LAB
ALBUMIN SERPL-MCNC: 3.7 G/DL
ALP BLD-CCNC: 103 U/L
ALT SERPL-CCNC: 39 U/L
ANION GAP SERPL CALCULATED.3IONS-SCNC: 13 MMOL/L
AST SERPL-CCNC: 28 U/L
BASOPHILS ABSOLUTE: NORMAL
BASOPHILS RELATIVE PERCENT: NORMAL
BILIRUB SERPL-MCNC: 0.4 MG/DL (ref 0.1–1.4)
BUN BLDV-MCNC: 13 MG/DL
CALCIUM SERPL-MCNC: 9.2 MG/DL
CHLORIDE BLD-SCNC: 106 MMOL/L
CHOLESTEROL, TOTAL: 162 MG/DL
CHOLESTEROL/HDL RATIO: ABNORMAL
CO2: 27 MMOL/L
CREAT SERPL-MCNC: 1.3 MG/DL
EOSINOPHILS ABSOLUTE: NORMAL
EOSINOPHILS RELATIVE PERCENT: NORMAL
GFR CALCULATED: 68
GLUCOSE BLD-MCNC: 96 MG/DL
HCT VFR BLD CALC: 42.8 % (ref 41–53)
HDLC SERPL-MCNC: 34 MG/DL (ref 35–70)
HEMOGLOBIN: 14.1 G/DL (ref 13.5–17.5)
IRON SATURATION: 24
IRON: 55
LDL CHOLESTEROL CALCULATED: 97 MG/DL (ref 0–160)
LYMPHOCYTES ABSOLUTE: NORMAL
LYMPHOCYTES RELATIVE PERCENT: NORMAL
MCH RBC QN AUTO: NORMAL PG
MCHC RBC AUTO-ENTMCNC: NORMAL G/DL
MCV RBC AUTO: NORMAL FL
MONOCYTES ABSOLUTE: NORMAL
MONOCYTES RELATIVE PERCENT: NORMAL
NEUTROPHILS ABSOLUTE: NORMAL
NEUTROPHILS RELATIVE PERCENT: NORMAL
PLATELET # BLD: 311 K/ΜL
PMV BLD AUTO: NORMAL FL
POTASSIUM SERPL-SCNC: 4.2 MMOL/L
PROTEIN, URINE, RANDOM: 3.1
PTH INTACT: 76.9
RBC # BLD: 5.17 10^6/ΜL
SODIUM BLD-SCNC: 142 MMOL/L
TOTAL IRON BINDING CAPACITY: 229
TOTAL PROTEIN: NORMAL
TRIGL SERPL-MCNC: ABNORMAL MG/DL
URIC ACID: 3.7
VLDLC SERPL CALC-MCNC: ABNORMAL MG/DL
WBC # BLD: 6.67 10^3/ML

## 2020-06-17 LAB
BASOPHILS ABSOLUTE: NORMAL
BASOPHILS RELATIVE PERCENT: NORMAL
BILIRUBIN, URINE: NEGATIVE
BLOOD, URINE: NEGATIVE
BUN BLDV-MCNC: 13 MG/DL
CALCIUM SERPL-MCNC: 9 MG/DL
CHLORIDE BLD-SCNC: 106 MMOL/L
CHOLESTEROL, TOTAL: 160 MG/DL
CHOLESTEROL/HDL RATIO: NORMAL
CLARITY: CLEAR
CO2: 26 MMOL/L
COLOR: YELLOW
CREAT SERPL-MCNC: 9.1 MG/DL
CREAT SERPL-MCNC: NORMAL MG/DL
CREATININE, URINE: NORMAL
EOSINOPHILS ABSOLUTE: NORMAL
EOSINOPHILS RELATIVE PERCENT: NORMAL
GFR CALCULATED: 75
GLUCOSE BLD-MCNC: 92 MG/DL
GLUCOSE URINE: NEGATIVE
HCT VFR BLD CALC: 43.6 % (ref 41–53)
HDLC SERPL-MCNC: 35 MG/DL (ref 35–70)
HEMOGLOBIN: 14.2 G/DL (ref 13.5–17.5)
KETONES, URINE: NEGATIVE
LDL CHOLESTEROL CALCULATED: 97 MG/DL (ref 0–160)
LEUKOCYTE ESTERASE, URINE: NEGATIVE
LYMPHOCYTES ABSOLUTE: NORMAL
LYMPHOCYTES RELATIVE PERCENT: NORMAL
MCH RBC QN AUTO: NORMAL PG
MCHC RBC AUTO-ENTMCNC: NORMAL G/DL
MCV RBC AUTO: NORMAL FL
MICROALBUMIN/CREAT 24H UR: 21.6 MG/G{CREAT}
MICROALBUMIN/CREAT UR-RTO: NORMAL
MONOCYTES ABSOLUTE: NORMAL
MONOCYTES RELATIVE PERCENT: NORMAL
NEUTROPHILS ABSOLUTE: NORMAL
NEUTROPHILS RELATIVE PERCENT: NORMAL
NITRITE, URINE: NEGATIVE
PH UA: 7 (ref 4.5–8)
PLATELET # BLD: 301 K/ΜL
PMV BLD AUTO: NORMAL FL
POTASSIUM SERPL-SCNC: 4.3 MMOL/L
PROTEIN UA: NEGATIVE
PTH INTACT: 72
RBC # BLD: 5.24 10^6/ΜL
SODIUM BLD-SCNC: 141 MMOL/L
SPECIFIC GRAVITY, URINE: <=1.005
TRIGL SERPL-MCNC: 165 MG/DL
UROBILINOGEN, URINE: NORMAL
VLDLC SERPL CALC-MCNC: NORMAL MG/DL
WBC # BLD: 9.04 10^3/ML

## 2020-07-16 ENCOUNTER — OFFICE VISIT (OUTPATIENT)
Dept: NEPHROLOGY | Age: 30
End: 2020-07-16
Payer: COMMERCIAL

## 2020-07-16 VITALS
SYSTOLIC BLOOD PRESSURE: 139 MMHG | WEIGHT: 224 LBS | BODY MASS INDEX: 36.15 KG/M2 | HEART RATE: 65 BPM | DIASTOLIC BLOOD PRESSURE: 86 MMHG | TEMPERATURE: 98.2 F | OXYGEN SATURATION: 97 %

## 2020-07-16 PROCEDURE — 99213 OFFICE O/P EST LOW 20 MIN: CPT | Performed by: INTERNAL MEDICINE

## 2020-07-16 PROCEDURE — 1036F TOBACCO NON-USER: CPT | Performed by: INTERNAL MEDICINE

## 2020-07-16 PROCEDURE — G8427 DOCREV CUR MEDS BY ELIG CLIN: HCPCS | Performed by: INTERNAL MEDICINE

## 2020-07-16 PROCEDURE — G8417 CALC BMI ABV UP PARAM F/U: HCPCS | Performed by: INTERNAL MEDICINE

## 2020-07-16 RX ORDER — DOXYCYCLINE HYCLATE 100 MG
100 TABLET ORAL 2 TIMES DAILY
COMMUNITY
Start: 2020-07-13 | End: 2020-07-23

## 2020-08-18 LAB
BASOPHILS ABSOLUTE: NORMAL
BASOPHILS RELATIVE PERCENT: NORMAL
BUN BLDV-MCNC: 18 MG/DL
CALCIUM SERPL-MCNC: 9.1 MG/DL
CHLORIDE BLD-SCNC: 106 MMOL/L
CO2: 25 MMOL/L
CREAT SERPL-MCNC: 1.3 MG/DL
EOSINOPHILS ABSOLUTE: NORMAL
EOSINOPHILS RELATIVE PERCENT: NORMAL
GFR CALCULATED: 68
GLUCOSE BLD-MCNC: 95 MG/DL
HCT VFR BLD CALC: 42.4 % (ref 41–53)
HEMOGLOBIN: 13.6 G/DL (ref 13.5–17.5)
LYMPHOCYTES ABSOLUTE: NORMAL
LYMPHOCYTES RELATIVE PERCENT: NORMAL
MCH RBC QN AUTO: NORMAL PG
MCHC RBC AUTO-ENTMCNC: NORMAL G/DL
MCV RBC AUTO: NORMAL FL
MONOCYTES ABSOLUTE: NORMAL
MONOCYTES RELATIVE PERCENT: NORMAL
NEUTROPHILS ABSOLUTE: NORMAL
NEUTROPHILS RELATIVE PERCENT: NORMAL
PLATELET # BLD: 287 K/ΜL
PMV BLD AUTO: NORMAL FL
POTASSIUM SERPL-SCNC: 4.1 MMOL/L
RBC # BLD: 5.14 10^6/ΜL
SODIUM BLD-SCNC: 140 MMOL/L
WBC # BLD: 8.04 10^3/ML

## 2020-10-19 ENCOUNTER — TELEPHONE (OUTPATIENT)
Dept: NEPHROLOGY | Age: 30
End: 2020-10-19

## 2020-11-03 PROBLEM — I10 HYPERTENSION: Status: RESOLVED | Noted: 2020-11-03 | Resolved: 2020-11-03

## 2020-11-07 LAB
BASOPHILS ABSOLUTE: ABNORMAL
BASOPHILS RELATIVE PERCENT: ABNORMAL
BUN BLDV-MCNC: 11 MG/DL
CALCIUM SERPL-MCNC: 8.8 MG/DL
CHLORIDE BLD-SCNC: 105 MMOL/L
CO2: 26 MMOL/L
CREAT SERPL-MCNC: 1.2 MG/DL
EOSINOPHILS ABSOLUTE: ABNORMAL
EOSINOPHILS RELATIVE PERCENT: ABNORMAL
GFR CALCULATED: 75
GLUCOSE BLD-MCNC: 85 MG/DL
HCT VFR BLD CALC: 41 % (ref 41–53)
HEMOGLOBIN: 13.3 G/DL (ref 13.5–17.5)
LYMPHOCYTES ABSOLUTE: ABNORMAL
LYMPHOCYTES RELATIVE PERCENT: ABNORMAL
MCH RBC QN AUTO: ABNORMAL PG
MCHC RBC AUTO-ENTMCNC: ABNORMAL G/DL
MCV RBC AUTO: ABNORMAL FL
MONOCYTES ABSOLUTE: ABNORMAL
MONOCYTES RELATIVE PERCENT: ABNORMAL
NEUTROPHILS ABSOLUTE: ABNORMAL
NEUTROPHILS RELATIVE PERCENT: ABNORMAL
PLATELET # BLD: 328 K/ΜL
PMV BLD AUTO: ABNORMAL FL
POTASSIUM SERPL-SCNC: 3.8 MMOL/L
RBC # BLD: 4.99 10^6/ΜL
SODIUM BLD-SCNC: 139 MMOL/L
WBC # BLD: 6.91 10^3/ML

## 2020-12-10 ENCOUNTER — TELEPHONE (OUTPATIENT)
Dept: NEPHROLOGY | Age: 30
End: 2020-12-10

## 2020-12-19 LAB
BILIRUBIN, URINE: NEGATIVE
BLOOD, URINE: NORMAL
CLARITY: CLEAR
COLOR: YELLOW
GLUCOSE URINE: NEGATIVE
KETONES, URINE: NEGATIVE
LEUKOCYTE ESTERASE, URINE: NEGATIVE
NITRITE, URINE: NEGATIVE
PH UA: 6.5 (ref 4.5–8)
PROTEIN UA: NEGATIVE
PTH INTACT: 54.3
SPECIFIC GRAVITY, URINE: 1.01
UROBILINOGEN, URINE: NORMAL

## 2021-01-27 NOTE — PROGRESS NOTES
[facility]                               Pulmonary and Sleep medicine clinic follow note. Letitia Vieira        Chief Complaint:Patient is here for yearly sleep follow up. Chief complaint and [place]: Letitia Vieira is a 26 y. o.oldmale came for follow up regarding his sleep apnea. He is currently using his positive airway pressure device with an auto CPAP with minimum pressure of 10cm H20 and maximum pressure of 16cm H20. He denies any problems with machine or mask. He could not able to tolerate fixed CPAP prssure in the past. He is currently using nasal pillows with out any difficulty. He is sleeping well at night with out difficulty. He denies any daytime sleepiness. He was diagnosed with a bronchial asthma during her childhood. According to his mother patient outgrew his asthma and currently not using any inhalers. He is having shortness of breath: Yes  Onset: gradual   Duration:2months. Diurnal variation:  None. Current functional capacity on level ground: 1 to 2 block/s on level ground. He was noted to having shortness of breath while walking in [place]. He can climb steps: Yes  Flights of steps he can climb: 1 with difficulty  He denies orthopnea. He denies paroxysmal nocturnal dyspnea. [place] Score:    Sign/Symptom:                  Score:    Symptoms of  DVT :    0.       (3)                                 Tachycardia:               0. (1.5)  Immobilization:           0. (1.5)  History of VTE:           0. (1)  Malignancy:                0. (1)  Hemoptysis:               0. (1)  No alternative diagnosis: 3  (3)    Total score:         3    He is having cough: No    He was never diagnosed with pulmonary tuberculosis in the past. He denies any recent exposure to any patients with tuberculosis. He denies any recent travel to endemic places of tuberculosis. His PPD test is :Unknown.        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  History of recreational or IV drug use in the past:NO     History of exposure to coal mines/coal dust: NO  History of exposure to [employer] dust/welding: NO  History of exposure to quarry/silica/sandblasting: NO  History of exposure to asbestos/working with breaks/ships: NO  History of exposure to farm dust: NO  History of recent travel to long distances: NO  History of exposure to birds, pigeons, or chickens in the past:NO  Pet animals at home:No    History of pulmonary embolism in the past: No            History of DVT in the past:No              Review of Systems:   General/Constitutional: he gained 14lbs of weight from the last visit with normal appetite. No fever or chills. HENT: Negative. Eyes: Negative. Upper respiratory tract: Occasional nasal stuffiness with post nasal drip. He is currently using her Nasonex nasal spray good reported compliance by his mother. Lower respiratory tract/ lungs: No cough or sputum production. No hemoptysis. According to his mother patient noticed to have worsening of shortness of breath especially with exertion. She want to be evaluated regarding this. Cardiovascular: No palpitations or chest pain. Gastrointestinal: No nausea or vomiting. Neurological: No focal neurologiacal weakness. Extremities: No edema. Musculoskeletal: No complaints. Genitourinary: No complaints. Hematological: Negative. Psychiatric/Behavioral: Negative. Skin: No itching.       Past Medical History:   Diagnosis Date    Anemia in chronic kidney disease(285.21)     Bowel obstruction ([facility] 1988  Cerebral palsy (HCC)     Chronic kidney disease, stage IV (severe) (HCC)     if develops ESRD would do peritoneal dialysis and possible kidney transplant.     Developmental disorder     Hard of hearing     Hypertension     Hypothyroidism     Mentally disabled     Pre-transplant evaluation for CKD (chronic kidney disease) 04/20/2017    [facility] to [facility] Pre Kidney transplant    Proteinuria        Past Surgical History:   Procedure Laterality Date    ABDOMEN SURGERY      bowel obstruction and repair-1988, g tube placed and removed age 1   [employer] ADENOIDECTOMY      EYE SURGERY  age 1    bilateral eyes    TYMPANOSTOMY TUBE PLACEMENT      multiple up to age 4       Social History     Tobacco Use    Smoking status: Never Smoker    Smokeless tobacco: Never Used   Substance Use Topics    Alcohol use: No    Drug use: No       Allergies   Allergen Reactions    Ceclor [Cefaclor]     Lisinopril      Should avoid taking r/t potassium issues, not true allergy    Tegretol [Carbamazepine]        Current Outpatient Medications   Medication Sig Dispense Refill    mupirocin (BACTROBAN) 2 % ointment 2 times daily      allopurinol (ZYLOPRIM) 300 MG tablet Take 1 tablet by mouth daily 90 tablet 3    aspirin 81 MG chewable tablet Take 81 mg by mouth daily      Sodium Chloride-Sodium Bicarb (AYR SALINE NASAL RINSE NA) by Nasal route daily      CPAP Machine MISC by Does not apply route Please change his current Auto CPAP pressure settings to an auto CPAP with minimum pressure of 10cm H20 and maximum pressure of 16 cm H20. 1 each 0    polycarbophil (FIBERCON) 625 MG tablet Take 625 mg by mouth 2 times daily      amLODIPine (NORVASC) 5 MG tablet Take 1 tablet by mouth daily 90 tablet 3    furosemide (LASIX) 20 MG tablet Take 1 tablet by mouth daily 90 tablet 3    MYCOPHENOLATE SODIUM PO Take 540 mg by mouth 2 times daily       Cholecalciferol (VITAMIN D) 2000 units CAPS capsule Take by mouth Left Ear: External ear normal.   Mouth/Throat: Oropharynx is clear and moist.  No oral thrush. Eyes: Conjunctivae are normal. Pupils are equal, round, and reactive to light. No scleral icterus. Neck: Neck supple. No JVD present. No tracheal deviation present. Cardiovascular: Normal rate, regular rhythm, normal heart sounds. No murmur heard. Pulmonary/Chest: Effort normal and breath sounds normal. No stridor. No respiratory distress. No wheezes. No rales. Patient exhibits no tenderness. Abdominal: Soft. Patient exhibits no distension. No tenderness. Musculoskeletal: Normal range of motion. Extremities: Patient exhibits no edema and no tenderness. Lymphadenopathy:  No cervical adenopathy. Neurological: Patient is alert and oriented to person, place, and time. Skin: Skin is warm and dry. Patient is not diaphoretic. Psychiatric: Patient  has a normal mood and affect. Patient behavior is normal.          Diagnostic Data: PSG-7/11/18  AHI:7.5  PAP Download:   Recorded compliance dates:1/24/21-2/22/21  More than 4hour usage compliance was:100%. Average residual Apnea- Hypoapnea index on current pressue was:1.4       PAP Type   Autoset   Level  [id number]      Average usuage hours per day was:7hrs 14min      Interface: pillows     Provider:  []?[facility]             []?Rachel                        []?Shravan          [x]? [facility]                           []?[facility]      []? Other:     95th percentile leak : 16.4L/min  95th percentile pressure: 11.8cm H20       Assesment:  -Mild obstructive sleep apnea on treatment with an auto CPAP with minimum pressure of 10cm H20 and maximum pressure of 16cm H20. He denies any problems with machine or mask- he is using his CPAP device with excellent compliance to > hours of therapy. His respiratory events were under control. He is benefiting from current CPAP therapy and would like to continue the therapy. -Hypersomnia most likely due to obstructive sleep apnea- Improved. -Exertional dyspnea with a history of bronchial asthma during childhood. Differential diagnosis include uncontrolled bronchial asthma VS deconditioning with the obesity VS other etiologies including cardiac.  -Cerebral palsy ([address] -Chronic kidney disease, stage IV (severe) (HCC) S/p Kidney transplant.  -Hypertension under control  -Hypothyroidism.  -Mentally disabled.  -hx of seizures in the past I.e during his child bear. He is currently following with a Neurologist Dr. Sarah Oconnor at [facility]. He is currently on Lamictal.  -Tremors.  -Allergic rhinitis currently on treatment with Nasonex nasal spray. Recommendations/Plan:  -Will send serum D- dimer today to evaluate for etiology of exertional dyspnea. He was advised and instructed to call my office in [facility] to go over the above test results and management. He verbalizes understanding.  -Schedule patient for full pulmonary function tests before clinic visit.  -Schedule patient for chest X-ray PA and Lateral views in 1month to evaluate his lung fields due to hx of bronchial asthma. Chest Xray need to be done 2days before clinic visit.  -Schedule patient for Echocardiogram with 2D doppler in 1month to further evaluate for Left and Rt ventricular function along with Pulmonary artery pressures.  -Will schedule patient for follow with my pulmonary clinic or any available provider at center for pulmonary disease with above Chest Xray and PFTS to further evaluate and manage Asthma and exertional dyspnea in 1month. - Schedule patient for follow up with my clinic in 1month with recommended test/s. Patient advised to make early appointment if needed. -He was advised to continue current positive airway pressure therapy with above described pressure.  He not able to tolerate fixed CPAP pressure settings in the past. -He advised to keep good compliance with current recommended pressure to get optimal results and clinical improvement.  -Follow with my clinic in 12months for clinical reevaluation with review of download. -He was advised to call [employer] regarding supplies if needed. -He was advised to call my office for earlier appointment if needed for worsening of sleep symptoms.  -He was advised to loose weight by controlling diet and doing exercise.  -Keila Silverman and his mother were educated about my impression and plan. She verbalizes understanding. Total time spent interviewing the patient and/or family, evaluating lab data and X-ray data and processing orders was 45 Minutes. I personally spent more than 50% of the appointment time face to face with the patient providing counseling and coordinating the patient's care.

## 2021-02-25 ENCOUNTER — OFFICE VISIT (OUTPATIENT)
Dept: PULMONOLOGY | Age: 31
End: 2021-02-25
Payer: COMMERCIAL

## 2021-02-25 VITALS
WEIGHT: 230 LBS | DIASTOLIC BLOOD PRESSURE: 78 MMHG | HEART RATE: 66 BPM | BODY MASS INDEX: 37.12 KG/M2 | SYSTOLIC BLOOD PRESSURE: 126 MMHG | TEMPERATURE: 97.5 F | OXYGEN SATURATION: 97 %

## 2021-02-25 DIAGNOSIS — G47.33 OSA ON CPAP: ICD-10-CM

## 2021-02-25 DIAGNOSIS — Z99.89 OSA ON CPAP: ICD-10-CM

## 2021-02-25 DIAGNOSIS — R06.09 EXERTIONAL DYSPNEA: Primary | ICD-10-CM

## 2021-02-25 PROCEDURE — G8417 CALC BMI ABV UP PARAM F/U: HCPCS | Performed by: INTERNAL MEDICINE

## 2021-02-25 PROCEDURE — 99215 OFFICE O/P EST HI 40 MIN: CPT | Performed by: INTERNAL MEDICINE

## 2021-02-25 PROCEDURE — G8484 FLU IMMUNIZE NO ADMIN: HCPCS | Performed by: INTERNAL MEDICINE

## 2021-02-25 PROCEDURE — G8427 DOCREV CUR MEDS BY ELIG CLIN: HCPCS | Performed by: INTERNAL MEDICINE

## 2021-02-25 PROCEDURE — 1036F TOBACCO NON-USER: CPT | Performed by: INTERNAL MEDICINE

## 2021-02-25 NOTE — PROGRESS NOTES
Chief Complaint:Patient is here for yearly sleep follow up. Mallampati airway Class:IV  Neck Circumference:16.5 Inches    [place] sleepiness score 2/25/21:6.  SAQLI:72    Diagnostic Data: PSG-7/11/18  AHI:7.5  PAP Download:   Recorded compliance dates:1/24/21-2/22/21  More than 4hour usage compliance was:100%.   Average residual Apnea- Hypoapnea index on current pressue was:1.4      PAP Type   Autoset   Level  [id number]     Average usuage hours per day was:7hrs 14min     Interface: pillows    Provider:  [][facility]  [][facility]  [][employer]  [x][facility]         [][facility] []Other:

## 2021-02-25 NOTE — PATIENT INSTRUCTIONS
Recommendations/Plan:  -Will send serum D- dimer today to evaluate for etiology of exertional dyspnea. He was advised and instructed to call my office in [facility] to go over the above test results and management. He verbalizes understanding.  -Schedule patient for full pulmonary function tests before clinic visit.  -Schedule patient for chest X-ray PA and Lateral views in 1month to evaluate his lung fields due to hx of bronchial asthma. Chest Xray need to be done 2days before clinic visit.  -Schedule patient for Echocardiogram with 2D doppler in 1month to further evaluate for Left and Rt ventricular function along with Pulmonary artery pressures.  -Will schedule patient for follow with my pulmonary clinic or any available provider at center for pulmonary disease with above Chest Xray and PFTS to further evaluate and manage Asthma and exertional dyspnea in 1month. - Schedule patient for follow up with my clinic in 1month with recommended test/s. Patient advised to make early appointment if needed.            -He was advised to continue current positive airway pressure therapy with above described pressure. He not able to tolerate fixed CPAP pressure settings in the past.  -He advised to keep good compliance with current recommended pressure to get optimal results and clinical improvement.  -Follow with my clinic in 12months for clinical reevaluation with review of download. -He was advised to call [employer] regarding supplies if needed. -He was advised to call my office for earlier appointment if needed for worsening of sleep symptoms.  -He was advised to loose weight by controlling diet and doing exercise.  -Gary Najera and his mother were educated about my impression and plan.  She verbalizes understanding.

## 2021-03-17 DIAGNOSIS — R06.09 EXERTIONAL DYSPNEA: ICD-10-CM

## 2021-03-17 DIAGNOSIS — G47.33 OSA ON CPAP: ICD-10-CM

## 2021-03-17 DIAGNOSIS — Z99.89 OSA ON CPAP: ICD-10-CM

## 2021-03-18 DIAGNOSIS — G47.33 OSA ON CPAP: ICD-10-CM

## 2021-03-18 DIAGNOSIS — R06.09 EXERTIONAL DYSPNEA: ICD-10-CM

## 2021-03-18 DIAGNOSIS — Z99.89 OSA ON CPAP: ICD-10-CM

## 2021-03-22 DIAGNOSIS — G47.33 OSA ON CPAP: ICD-10-CM

## 2021-03-22 DIAGNOSIS — Z99.89 OSA ON CPAP: ICD-10-CM

## 2021-03-22 DIAGNOSIS — R06.09 EXERTIONAL DYSPNEA: ICD-10-CM

## 2021-04-01 ENCOUNTER — OFFICE VISIT (OUTPATIENT)
Dept: PULMONOLOGY | Age: 31
End: 2021-04-01
Payer: COMMERCIAL

## 2021-04-01 VITALS
SYSTOLIC BLOOD PRESSURE: 132 MMHG | DIASTOLIC BLOOD PRESSURE: 80 MMHG | TEMPERATURE: 97.9 F | WEIGHT: 228.2 LBS | OXYGEN SATURATION: 98 % | HEIGHT: 66 IN | BODY MASS INDEX: 36.67 KG/M2 | HEART RATE: 71 BPM

## 2021-04-01 DIAGNOSIS — R06.09 EXERTIONAL DYSPNEA: Primary | ICD-10-CM

## 2021-04-01 PROCEDURE — 1036F TOBACCO NON-USER: CPT | Performed by: NURSE PRACTITIONER

## 2021-04-01 PROCEDURE — G8427 DOCREV CUR MEDS BY ELIG CLIN: HCPCS | Performed by: NURSE PRACTITIONER

## 2021-04-01 PROCEDURE — G8417 CALC BMI ABV UP PARAM F/U: HCPCS | Performed by: NURSE PRACTITIONER

## 2021-04-01 PROCEDURE — 99214 OFFICE O/P EST MOD 30 MIN: CPT | Performed by: NURSE PRACTITIONER

## 2021-04-01 ASSESSMENT — ENCOUNTER SYMPTOMS
DIARRHEA: 0
SHORTNESS OF BREATH: 1
STRIDOR: 0
WHEEZING: 0
CHEST TIGHTNESS: 0
VOMITING: 0
COUGH: 0
NAUSEA: 0

## 2021-04-01 NOTE — PROGRESS NOTES
[facility]    Patient: Cali Reyes, 26 y.o.   : 1990  2021    Pt of Dr. Jessica Garces   Patient presents with    Follow-up     SOB 1 month follow up with PFT/CXR: 2021, ECHO: 2021        LEOLA Murray is here for follow up for KAISER and heavy breathing. PMH CKD, bowel obstruction, cerebral palsy, seasonal allergies. Patient is with his mother today who helps provide history. Shira Mcmahon was diagnosed with asthma by his pediatrician and was treated with inhaled medications until his teenage years at that time he switched providers and did not continue on any inhaled therapy and he has not had any issues until within the past year. His mother reports heavy breathing after exertion and periodically at home. Denies any significant coughing, wheezing, sputum production sneezing or itchy watery eyes. Of note patient was diagnosed with Covid in Oct 2020 and had significant cough at that time but this has since resolved. Patient and mother are here today to review testing for KAISER. He was previously following with ENT for chronic sinus issues in [place] is taking allegra nasonex and doing nasal rinses. Patient denies limitation from his breathing for completing activities. Non-smoker    Progress History:   Since last visit any new medical issues? No  New ER or hospital visits? No  Any new or changes in medicines? No  Using inhalers? No    Flu vaccine? Reported UTD  Past Medical hx   PMH:  Past Medical History:   Diagnosis Date    Anemia in chronic kidney disease(285.21)     Bowel obstruction ([address]     Cerebral palsy (HCC)     Chronic kidney disease, stage IV (severe) ([address]     if develops ESRD would do peritoneal dialysis and possible kidney transplant.     Developmental disorder     Hard of hearing     Hypertension     Hypothyroidism     Mentally disabled     Pre-transplant evaluation for CKD (chronic kidney disease) 04/20/2017    mailed to [facility] Pre Kidney transplant    Proteinuria      SURGICAL HISTORY:  Past Surgical History:   Procedure Laterality Date    ABDOMEN SURGERY      bowel obstruction and repair-1988, g tube placed and removed age 1   Dori Cristian ADENOIDECTOMY      EYE SURGERY  age 1    bilateral eyes    TYMPANOSTOMY TUBE PLACEMENT      multiple up to age 4     SOCIAL HISTORY:  Social History     Tobacco Use    Smoking status: Never Smoker    Smokeless tobacco: Never Used   Substance Use Topics    Alcohol use: No    Drug use: No     ALLERGIES:  Allergies   Allergen Reactions    Ceclor [Cefaclor]     Lisinopril      Should avoid taking r/t potassium issues, not true allergy    Tegretol [Carbamazepine]      FAMILY HISTORY:  Family History   Problem Relation Age of Onset    Hypertension Mother     Diabetes Mother     Cancer Father     Kidney Disease Maternal Grandmother      CURRENT MEDICATIONS:  Current Outpatient Medications   Medication Sig Dispense Refill    mupirocin (BACTROBAN) 2 % ointment 2 times daily      allopurinol (ZYLOPRIM) 300 MG tablet Take 1 tablet by mouth daily 90 tablet 3    aspirin 81 MG chewable tablet Take 81 mg by mouth daily      Sodium Chloride-Sodium Bicarb (AYR SALINE NASAL RINSE NA) by Nasal route daily      CPAP Machine MISC by Does not apply route Please change his current Auto CPAP pressure settings to an auto CPAP with minimum pressure of 10cm H20 and maximum pressure of 16 cm H20. 1 each 0    polycarbophil (FIBERCON) 625 MG tablet Take 625 mg by mouth 2 times daily      amLODIPine (NORVASC) 5 MG tablet Take 1 tablet by mouth daily 90 tablet 3    furosemide (LASIX) 20 MG tablet Take 1 tablet by mouth daily 90 tablet 3    MYCOPHENOLATE SODIUM PO Take 540 mg by mouth 2 times daily       Cholecalciferol (VITAMIN D) 2000 units CAPS capsule Take by mouth      CPAP Machine MISC by Does not apply route Please change his current Auto CPAP pressure settings to an auto CPAP with minimum pressure of 8 cm H20 and maximum pressure of 14 cm H20. 1 each 0    acetaminophen (TYLENOL) 325 MG tablet Take 650 mg by mouth every 6 hours as needed for Pain      ferrous sulfate 325 (65 Fe) MG tablet Take 325 mg by mouth 3 times daily (with meals)       labetalol (NORMODYNE) 100 MG tablet Take 100 mg by mouth 3 times daily       pantoprazole (PROTONIX) 40 MG tablet Take 40 mg by mouth      Everolimus 0.5 MG TABS Take 0.5 mg by mouth 2 tablets every 12 hours      lamoTRIgine (LAMICTAL) 100 MG tablet Take 100 mg by mouth 2 times daily       risperiDONE (RISPERDAL) 1 MG tablet Take 1 mg by mouth every evening  60 tablet 2    LORazepam (ATIVAN) 1 MG tablet Take 1 tablet by mouth 3 times daily as needed      NASONEX 50 MCG/ACT nasal spray USE TWO SPRAY(S) IN EACH NOSTRIL ONCE DAILY 1 Inhaler 3    levothyroxine (SYNTHROID) 88 MCG tablet Take 88 mcg by mouth daily       fexofenadine (ALLEGRA) 60 MG tablet Take 180 mg by mouth daily. No current facility-administered medications for this visit. [facility]   Review of Systems   Constitutional: Negative for chills, fever and unexpected weight change. Respiratory: Positive for shortness of breath. Negative for cough, chest tightness, wheezing and stridor. Cardiovascular: Negative for chest pain and leg swelling. Gastrointestinal: Negative for diarrhea, nausea and vomiting. Genitourinary: Negative for dysuria. Physical exam   /80 (Site: Left Upper Arm, Position: Sitting, Cuff Size: Medium Adult)   Pulse 71   Temp 97.9 °F (36.6 °C)   Ht 5' 6\" (1.676 m)   Wt 228 lb 3.2 oz (103.5 kg)   SpO2 98% Comment: on room air at rest  BMI 36.83 kg/m²    Wt Readings from Last 3 Encounters:   04/01/21 228 lb 3.2 oz (103.5 kg)   02/25/21 230 lb (104.3 kg)   07/16/20 224 lb (101.6 kg)       Physical Exam  Vitals signs and nursing note reviewed. Constitutional:       General: He is not in acute distress. and mother data unreliable due to performance following directions  -Discussed symptoms with mother and patient no clinical respiratory limitation noted vital signs stable and testing that was able to be completed did not reveal any respiratory abnormality   -ADvised about maintaining regular activity and a healthy diet to lose weight  -Advised to keep follow-up in sleep clinic for FARIHA  -Advised patient to call office with any changes, questions, or concerns regarding respiratory status    Will see Sharlene Valdez back as needed for respiratory complaint    Alecia Guo CNP  4/1/2021

## 2021-04-19 ENCOUNTER — TELEPHONE (OUTPATIENT)
Dept: NEPHROLOGY | Age: 31
End: 2021-04-19

## 2021-04-19 NOTE — TELEPHONE ENCOUNTER
Mother called and states pt has a mass on his right upper thigh. She is concerned about the transplanted kidney. She wants to make sure it is okay w/you.

## 2021-04-22 ENCOUNTER — OFFICE VISIT (OUTPATIENT)
Dept: NEPHROLOGY | Age: 31
End: 2021-04-22
Payer: COMMERCIAL

## 2021-04-22 VITALS
DIASTOLIC BLOOD PRESSURE: 74 MMHG | TEMPERATURE: 97.7 F | SYSTOLIC BLOOD PRESSURE: 129 MMHG | BODY MASS INDEX: 36.32 KG/M2 | OXYGEN SATURATION: 97 % | HEART RATE: 62 BPM | WEIGHT: 225 LBS

## 2021-04-22 DIAGNOSIS — Z94.0 KIDNEY TRANSPLANT RECIPIENT: Primary | ICD-10-CM

## 2021-04-22 DIAGNOSIS — I10 ESSENTIAL HYPERTENSION: ICD-10-CM

## 2021-04-22 PROCEDURE — G8417 CALC BMI ABV UP PARAM F/U: HCPCS | Performed by: INTERNAL MEDICINE

## 2021-04-22 PROCEDURE — G8427 DOCREV CUR MEDS BY ELIG CLIN: HCPCS | Performed by: INTERNAL MEDICINE

## 2021-04-22 PROCEDURE — 99213 OFFICE O/P EST LOW 20 MIN: CPT | Performed by: INTERNAL MEDICINE

## 2021-04-22 PROCEDURE — 1036F TOBACCO NON-USER: CPT | Performed by: INTERNAL MEDICINE

## 2021-04-22 NOTE — PROGRESS NOTES
[facility]        Outpatient Follow-Up note         4/22/2021 10:55 AM    Patient Name:   Carlos Abdalla  YOB: 1990  Primary Care Physician:  Joaquina Pederson     Chief Complaint / Reason for follow-up : Follow Up of Kidney Transplant     Interval History :  Patient seen and examined by me. Feels well. No cp or SOB. Overall he is doing okay. Recently complaining of some pain in the right hip region and also the right foot. He had a scan done of the right leg which apparently showed some mass. Living Kidney Transplant - 12/2017 from aunt at [facility]  No Rejections . He is off gengraf   On evorolimus and cell cept     Past History :  Past Medical History:   Diagnosis Date    Anemia in chronic kidney disease(285.21)     Bowel obstruction ([address] 1988    Cerebral palsy (HCC)     Chronic kidney disease, stage IV (severe) ([address]     if develops ESRD would do peritoneal dialysis and possible kidney transplant.     Developmental disorder     Hard of hearing     Hypertension     Hypothyroidism     Mentally disabled     Pre-transplant evaluation for CKD (chronic kidney disease) 04/20/2017    mailed to [facility] Pre Kidney transplant    Proteinuria      Past Surgical History:   Procedure Laterality Date    ABDOMEN SURGERY      bowel obstruction and repair-1988, g tube placed and removed age 1   Larance Yajaira ADENOIDECTOMY      EYE SURGERY  age 1    bilateral eyes    TYMPANOSTOMY TUBE PLACEMENT      multiple up to age 4        Medications :     Outpatient Medications Marked as Taking for the 4/22/21 encounter (Office Visit) with Gayla Man MD   Medication Sig Dispense Refill    mupirocin (BACTROBAN) 2 % ointment 2 times daily      allopurinol (ZYLOPRIM) 300 MG tablet Take 1 tablet by mouth daily 90 tablet 3    aspirin 81 MG chewable tablet Take 81 mg by mouth daily      Sodium Chloride-Sodium Bicarb (AYR SALINE NASAL RINSE NA) by Nasal route daily      CPAP Machine MISC by Does not apply route Please change his current Auto CPAP pressure settings to an auto CPAP with minimum pressure of 10cm H20 and maximum pressure of 16 cm H20. 1 each 0    polycarbophil (FIBERCON) 625 MG tablet Take 625 mg by mouth daily       amLODIPine (NORVASC) 5 MG tablet Take 1 tablet by mouth daily 90 tablet 3    furosemide (LASIX) 20 MG tablet Take 1 tablet by mouth daily 90 tablet 3    MYCOPHENOLATE SODIUM PO Take 540 mg by mouth 2 times daily       Cholecalciferol (VITAMIN D) 2000 units CAPS capsule Take by mouth      CPAP Machine MISC by Does not apply route Please change his current Auto CPAP pressure settings to an auto CPAP with minimum pressure of 8 cm H20 and maximum pressure of 14 cm H20. 1 each 0    acetaminophen (TYLENOL) 325 MG tablet Take 650 mg by mouth every 6 hours as needed for Pain      ferrous sulfate 325 (65 Fe) MG tablet Take 325 mg by mouth 3 times daily (with meals)       labetalol (NORMODYNE) 100 MG tablet Take 100 mg by mouth 3 times daily       pantoprazole (PROTONIX) 40 MG tablet Take 40 mg by mouth      Everolimus 0.5 MG TABS Take 0.5 mg by mouth 2 tablets every 12 hours      lamoTRIgine (LAMICTAL) 100 MG tablet Take 100 mg by mouth 2 times daily       risperiDONE (RISPERDAL) 1 MG tablet Take 1 mg by mouth every evening  60 tablet 2    LORazepam (ATIVAN) 1 MG tablet Take 1 tablet by mouth 3 times daily as needed      NASONEX 50 MCG/ACT nasal spray USE TWO SPRAY(S) IN EACH NOSTRIL ONCE DAILY 1 Inhaler 3    levothyroxine (SYNTHROID) 88 MCG tablet Take 88 mcg by mouth daily       fexofenadine (ALLEGRA) 60 MG tablet Take 180 mg by mouth daily.          Vitals     /74 (Site: Left Upper Arm, Position: Sitting, Cuff Size: Medium Adult)   Pulse 62   Temp 97.7 °F (36.5 °C) (Temporal)   Wt 225 lb (102.1 kg)   SpO2 97%   BMI 36.32 kg/m²  Wt Readings from Last 3 Encounters:   04/22/21 225 lb (102.1 kg)   04/01/21 228 lb 3.2 oz (103.5 kg) 02/25/21 230 lb (104.3 kg)        Physical Exam     General -- no distress  Lungs -- clear  Heart -- S1, S2 heard, JVD - no  Abdomen - soft, non-tender  Extremities --no significant edema. Examined his right thigh on the anterolateral side of the right hip it felt like a thickening could not exactly appreciate well confined mass at this time. CNS - awake and alert    Labs, Radiology and Tests    Labs -    9/18 3/19 11/19 6/20        Potassium 4.4 4.3 4.2 4.3        BUN 19 15 17 13        Creatinine 1.8 1.4 1.3 1.2        eGFR 48  68 75                    UPCR            [facility]                          Renal Ultrasound Scan        Assessment    1. Renal - chronic kidney disease stage III with a recent history of kidney transplant 12/2017  - GFR is much better at this time after stopping gengraf  - He is currently on mycophenolate  540 BID and also zotress 1 mg BID  2. essential hypertension-running well continue current medications. 3. Chronic sinusitis - resolved . 4. Gout on allopurinol  5. Query mass right hip-as mentioned above could not appreciate a well-defined mass. However will obtain scan from Earlene Gaitan and patient is seeing vascular surgeon as well. Will follow once it is done. 6. Meds reviewed and D/W patient and mother  7. FU in in July as scheduled . Tests and orders placed this Encounter     No orders of the defined types were placed in this encounter. Penny Galindo M.D  [facility].

## 2021-07-04 NOTE — TELEPHONE ENCOUNTER
----- Message from Mirna Garcia MD sent at 12/10/2020  9:40 AM EST -----  Patient was seen at [facility] 2 days ago please check if anything was done for the sirolimus level that was elevated last month    -I think please have him do another sirolimus level this month
Sirolimus level ordered and patient notified. Also sent mother a my chart message.
Strong peripheral pulses

## 2021-07-13 LAB
BUN BLDV-MCNC: 16 MG/DL
CALCIUM SERPL-MCNC: 8.8 MG/DL
CHLORIDE BLD-SCNC: 106 MMOL/L
CO2: 20 MMOL/L
CREAT SERPL-MCNC: 1.5 MG/DL
CREATININE, URINE: 153
GFR CALCULATED: 58
GLUCOSE BLD-MCNC: 95 MG/DL
MICROALBUMIN/CREAT 24H UR: 155.2 MG/G{CREAT}
MICROALBUMIN/CREAT UR-RTO: 101.4
POTASSIUM SERPL-SCNC: 4 MMOL/L
SODIUM BLD-SCNC: 144 MMOL/L

## 2021-07-15 ENCOUNTER — OFFICE VISIT (OUTPATIENT)
Dept: NEPHROLOGY | Age: 31
End: 2021-07-15
Payer: MEDICARE

## 2021-07-15 VITALS
SYSTOLIC BLOOD PRESSURE: 116 MMHG | OXYGEN SATURATION: 97 % | TEMPERATURE: 98.1 F | BODY MASS INDEX: 34.86 KG/M2 | HEART RATE: 68 BPM | DIASTOLIC BLOOD PRESSURE: 79 MMHG | WEIGHT: 216 LBS

## 2021-07-15 DIAGNOSIS — I10 ESSENTIAL HYPERTENSION: ICD-10-CM

## 2021-07-15 DIAGNOSIS — Z94.0 KIDNEY TRANSPLANT RECIPIENT: Primary | ICD-10-CM

## 2021-07-15 DIAGNOSIS — N18.31 STAGE 3A CHRONIC KIDNEY DISEASE (HCC): ICD-10-CM

## 2021-07-15 PROCEDURE — 99213 OFFICE O/P EST LOW 20 MIN: CPT | Performed by: INTERNAL MEDICINE

## 2021-07-15 PROCEDURE — 1036F TOBACCO NON-USER: CPT | Performed by: INTERNAL MEDICINE

## 2021-07-15 PROCEDURE — G8417 CALC BMI ABV UP PARAM F/U: HCPCS | Performed by: INTERNAL MEDICINE

## 2021-07-15 PROCEDURE — G8427 DOCREV CUR MEDS BY ELIG CLIN: HCPCS | Performed by: INTERNAL MEDICINE

## 2021-07-15 NOTE — PROGRESS NOTES
[facility]        Outpatient Follow-Up note         7/15/2021 11:21 AM    Patient Name:   Jossue Ojeda  YOB: 1990  Primary Care Physician:  Gala Cockayne     Chief Complaint / Reason for follow-up : Follow Up of Kidney Transplant     Interval History :  Patient seen and examined by me. Feels well. No cp or SOB. Overall he is doing okay. Recently complaining of some foot pain on and off. Living Kidney Transplant - 12/2017 from aunt at [address] [place] [address]  No Rejections . He is off gengraf   On evorolimus and cell cept     Past History :  Past Medical History:   Diagnosis Date    Anemia in chronic kidney disease(285.21)     Bowel obstruction ([address] 1988    Cerebral palsy (HCC)     Chronic kidney disease, stage IV (severe) ([address]     if develops ESRD would do peritoneal dialysis and possible kidney transplant.     Developmental disorder     Hard of hearing     Hypertension     Hypothyroidism     Mentally disabled     Pre-transplant evaluation for CKD (chronic kidney disease) 04/20/2017    mailed to [facility] Pre Kidney transplant    Proteinuria      Past Surgical History:   Procedure Laterality Date    ABDOMEN SURGERY      bowel obstruction and repair-1988, g tube placed and removed age 1   Eve Curl ADENOIDECTOMY      EYE SURGERY  age 1    bilateral eyes    TYMPANOSTOMY TUBE PLACEMENT      multiple up to age 4        Medications :     Outpatient Medications Marked as Taking for the 7/15/21 encounter (Office Visit) with Gustavo Taylor MD   Medication Sig Dispense Refill    mupirocin (BACTROBAN) 2 % ointment 2 times daily      allopurinol (ZYLOPRIM) 300 MG tablet Take 1 tablet by mouth daily 90 tablet 3    aspirin 81 MG chewable tablet Take 81 mg by mouth daily      Sodium Chloride-Sodium Bicarb (AYR SALINE NASAL RINSE NA) by Nasal route daily      CPAP Machine MISC by Does not apply route Please change his current Auto CPAP pressure settings to an auto CPAP with minimum pressure of 10cm H20 and maximum pressure of 16 cm H20. 1 each 0    polycarbophil (FIBERCON) 625 MG tablet Take 625 mg by mouth daily       amLODIPine (NORVASC) 5 MG tablet Take 1 tablet by mouth daily 90 tablet 3    furosemide (LASIX) 20 MG tablet Take 1 tablet by mouth daily 90 tablet 3    MYCOPHENOLATE SODIUM PO Take 540 mg by mouth 2 times daily       Cholecalciferol (VITAMIN D) 2000 units CAPS capsule Take by mouth      CPAP Machine MISC by Does not apply route Please change his current Auto CPAP pressure settings to an auto CPAP with minimum pressure of 8 cm H20 and maximum pressure of 14 cm H20. 1 each 0    acetaminophen (TYLENOL) 325 MG tablet Take 650 mg by mouth every 6 hours as needed for Pain      ferrous sulfate 325 (65 Fe) MG tablet Take 325 mg by mouth 3 times daily (with meals)       labetalol (NORMODYNE) 100 MG tablet Take 100 mg by mouth 3 times daily       pantoprazole (PROTONIX) 40 MG tablet Take 40 mg by mouth      Everolimus 0.5 MG TABS Take 0.5 mg by mouth 2 tablets every 12 hours      lamoTRIgine (LAMICTAL) 100 MG tablet Take 100 mg by mouth 2 times daily       risperiDONE (RISPERDAL) 1 MG tablet Take 1 mg by mouth every evening  60 tablet 2    LORazepam (ATIVAN) 1 MG tablet Take 1 tablet by mouth 3 times daily as needed      NASONEX 50 MCG/ACT nasal spray USE TWO SPRAY(S) IN EACH NOSTRIL ONCE DAILY 1 Inhaler 3    levothyroxine (SYNTHROID) 88 MCG tablet Take 88 mcg by mouth daily       fexofenadine (ALLEGRA) 60 MG tablet Take 180 mg by mouth daily.          Vitals     /79 (Site: Left Upper Arm, Position: Sitting, Cuff Size: Medium Adult)   Pulse 68   Temp 98.1 °F (36.7 °C) (Temporal)   Wt 216 lb (98 kg)   SpO2 97%   BMI 34.86 kg/m²  Wt Readings from Last 3 Encounters:   07/15/21 216 lb (98 kg)   04/22/21 225 lb (102.1 kg)   04/01/21 228 lb 3.2 oz (103.5 kg)        Physical Exam     General -- no distress  Lungs -- clear  Heart -- S1, S2 heard, JVD - no  Abdomen - soft, non-tender  Extremities --no significant edema. CNS - awake and alert    Labs, Radiology and Tests    Labs -    9/18 3/19 11/19 6/20 7/21       Potassium 4.4 4.3 4.2 4.3 4.0       BUN 19 15 17 13 16       Creatinine 1.8 1.4 1.3 1.2 1.5       eGFR 48  68 75 58                   UPCR     250       UMCR     101                     Renal Ultrasound Scan        Assessment    1. Renal - chronic kidney disease stage III with a recent history of kidney transplant 12/2017  - GFR is much better at this time after stopping gengraf now back to his baseline .   - He is currently on mycophenolate  540 BID and also zotress 1 mg BID  2. essential hypertension-running well continue current medications. 3. Chronic sinusitis - resolved . 4. Gout on allopurinol  5. Query mass right hip-seen surgeon and it si slipoma   6. FU in in 6 months     Tests and orders placed this Encounter     Orders Placed This Encounter   Procedures    Comprehensive Metabolic Panel    Creatinine, Random Urine    MICROALBUMIN, RANDOM URINE (W/O CREATININE)    Protein, urine, random    Urinalysis with Microscopic    Comprehensive Metabolic Panel       Yanet Ruffin M.D  [facility].

## 2021-08-26 LAB
ALBUMIN SERPL-MCNC: 3.8 G/DL
ALP BLD-CCNC: 98 U/L
ALT SERPL-CCNC: 38 U/L
ANION GAP SERPL CALCULATED.3IONS-SCNC: 10 MMOL/L
AST SERPL-CCNC: 26 U/L
BILIRUB SERPL-MCNC: 0.3 MG/DL (ref 0.1–1.4)
BUN BLDV-MCNC: 17 MG/DL
CALCIUM SERPL-MCNC: 9.2 MG/DL
CHLORIDE BLD-SCNC: 104 MMOL/L
CO2: 28 MMOL/L
CREAT SERPL-MCNC: 1.3 MG/DL
GFR CALCULATED: 68
GLUCOSE BLD-MCNC: 81 MG/DL
POTASSIUM SERPL-SCNC: 3.9 MMOL/L
SODIUM BLD-SCNC: 138 MMOL/L
TOTAL PROTEIN: 7.5

## 2021-12-27 RX ORDER — EVEROLIMUS TABLETS 0.5 MG/1
0.5 TABLET ORAL 2 TIMES DAILY
Qty: 60 TABLET | Refills: 3 | Status: SHIPPED | OUTPATIENT
Start: 2021-12-27 | End: 2022-01-25 | Stop reason: SDUPTHER

## 2022-01-22 LAB
ALBUMIN SERPL-MCNC: 4 G/DL
ALP BLD-CCNC: 105 U/L
ALT SERPL-CCNC: 38 U/L
ANION GAP SERPL CALCULATED.3IONS-SCNC: NORMAL MMOL/L
AST SERPL-CCNC: 22 U/L
BILIRUB SERPL-MCNC: 0.4 MG/DL (ref 0.1–1.4)
BILIRUBIN, URINE: NEGATIVE
BLOOD, URINE: POSITIVE
BUN BLDV-MCNC: 16 MG/DL
CALCIUM SERPL-MCNC: 9.6 MG/DL
CHLORIDE BLD-SCNC: 105 MMOL/L
CLARITY: CLEAR
CO2: 24 MMOL/L
COLOR: YELLOW
CREAT SERPL-MCNC: 1.4 MG/DL
CREATININE, RANDOM URINE: 9.4
GFR CALCULATED: 62
GLUCOSE BLD-MCNC: 98 MG/DL
GLUCOSE URINE: NEGATIVE
KETONES, URINE: NEGATIVE
LEUKOCYTE ESTERASE, URINE: NEGATIVE
NITRITE, URINE: NEGATIVE
PH UA: 7 (ref 4.5–8)
POTASSIUM SERPL-SCNC: 4.2 MMOL/L
PROTEIN UA: NEGATIVE
SODIUM BLD-SCNC: 143 MMOL/L
SPECIFIC GRAVITY, URINE: 1
TOTAL PROTEIN: 7.9
UROBILINOGEN, URINE: NORMAL

## 2022-01-25 ENCOUNTER — OFFICE VISIT (OUTPATIENT)
Dept: NEPHROLOGY | Age: 32
End: 2022-01-25
Payer: COMMERCIAL

## 2022-01-25 VITALS
DIASTOLIC BLOOD PRESSURE: 82 MMHG | TEMPERATURE: 97.6 F | BODY MASS INDEX: 37.12 KG/M2 | OXYGEN SATURATION: 96 % | HEART RATE: 76 BPM | WEIGHT: 230 LBS | SYSTOLIC BLOOD PRESSURE: 127 MMHG

## 2022-01-25 DIAGNOSIS — N18.31 STAGE 3A CHRONIC KIDNEY DISEASE (HCC): ICD-10-CM

## 2022-01-25 DIAGNOSIS — Z94.0 KIDNEY TRANSPLANT RECIPIENT: Primary | ICD-10-CM

## 2022-01-25 DIAGNOSIS — I10 ESSENTIAL HYPERTENSION: ICD-10-CM

## 2022-01-25 PROCEDURE — G8417 CALC BMI ABV UP PARAM F/U: HCPCS | Performed by: INTERNAL MEDICINE

## 2022-01-25 PROCEDURE — 1036F TOBACCO NON-USER: CPT | Performed by: INTERNAL MEDICINE

## 2022-01-25 PROCEDURE — G8427 DOCREV CUR MEDS BY ELIG CLIN: HCPCS | Performed by: INTERNAL MEDICINE

## 2022-01-25 PROCEDURE — 99213 OFFICE O/P EST LOW 20 MIN: CPT | Performed by: INTERNAL MEDICINE

## 2022-01-25 PROCEDURE — G8484 FLU IMMUNIZE NO ADMIN: HCPCS | Performed by: INTERNAL MEDICINE

## 2022-01-25 RX ORDER — EVEROLIMUS TABLETS 0.5 MG/1
0.5 TABLET ORAL 2 TIMES DAILY
Qty: 120 TABLET | Refills: 5 | Status: SHIPPED | OUTPATIENT
Start: 2022-01-25 | End: 2022-07-26 | Stop reason: SDUPTHER

## 2022-01-25 RX ORDER — MYCOPHENOLIC ACID 180 MG/1
540 TABLET, DELAYED RELEASE ORAL 2 TIMES DAILY
Qty: 180 TABLET | Refills: 3 | Status: SHIPPED | OUTPATIENT
Start: 2022-01-25 | End: 2022-07-26 | Stop reason: SDUPTHER

## 2022-01-25 RX ORDER — LABETALOL 100 MG/1
100 TABLET, FILM COATED ORAL 3 TIMES DAILY
Qty: 90 TABLET | Refills: 5 | Status: SHIPPED | OUTPATIENT
Start: 2022-01-25 | End: 2022-07-28 | Stop reason: SDUPTHER

## 2022-01-25 NOTE — TELEPHONE ENCOUNTER
Scripts pending. Pharmacy wont allow me to put in 540 mg BID of Mycophenalate.  It is making me post script for 180 mg

## 2022-01-25 NOTE — PROGRESS NOTES
[facility]        Outpatient Follow-Up note         1/25/2022 2:12 PM    Patient Name:   Lora Casas  YOB: 1990  Primary Care Physician:  Lb Asher     Chief Complaint / Reason for follow-up : Follow Up of Kidney Transplant     Interval History :  Patient seen and examined by me. Feels well. No cp. Some SOB. Being treated with abx for sinus infection . Living Kidney Transplant - 12/2017 from aunt at [facility]  No Rejections . He is off gengraf   On evorolimus and cell cept  No hospitalizations and no med changes . Past History :  Past Medical History:   Diagnosis Date    Anemia in chronic kidney disease(285.21)     Bowel obstruction (HCC) 1988    Cerebral palsy (HCC)     Chronic kidney disease, stage IV (severe) (HCC)     if develops ESRD would do peritoneal dialysis and possible kidney transplant.     Developmental disorder     Hard of hearing     Hypertension     Hypothyroidism     Mentally disabled     Pre-transplant evaluation for CKD (chronic kidney disease) 04/20/2017    mailed to [facility] Pre Kidney transplant    Proteinuria      Past Surgical History:   Procedure Laterality Date    ABDOMEN SURGERY      bowel obstruction and repair-1988, g tube placed and removed age 1   Gutierrez ADENOIDECTOMY      EYE SURGERY  age 1    bilateral eyes    TYMPANOSTOMY TUBE PLACEMENT      multiple up to age 4        Medications :     Outpatient Medications Marked as Taking for the 1/25/22 encounter (Office Visit) with Parker Jerez MD   Medication Sig Dispense Refill    Everolimus 0.5 MG TABS Take 0.5 mg by mouth 2 times daily 2 tablets every 12 hours 60 tablet 3    mupirocin (BACTROBAN) 2 % ointment 2 times daily      allopurinol (ZYLOPRIM) 300 MG tablet Take 1 tablet by mouth daily 90 tablet 3    aspirin 81 MG chewable tablet Take 81 mg by mouth daily      Sodium Chloride-Sodium Bicarb (AYR SALINE NASAL RINSE NA) by Nasal route daily      CPAP Machine MISC by Does not apply route Please change his current Auto CPAP pressure settings to an auto CPAP with minimum pressure of 10cm H20 and maximum pressure of 16 cm H20. 1 each 0    polycarbophil (FIBERCON) 625 MG tablet Take 625 mg by mouth daily       amLODIPine (NORVASC) 5 MG tablet Take 1 tablet by mouth daily 90 tablet 3    furosemide (LASIX) 20 MG tablet Take 1 tablet by mouth daily 90 tablet 3    MYCOPHENOLATE SODIUM PO Take 540 mg by mouth 2 times daily       Cholecalciferol (VITAMIN D) 2000 units CAPS capsule Take by mouth      CPAP Machine MISC by Does not apply route Please change his current Auto CPAP pressure settings to an auto CPAP with minimum pressure of 8 cm H20 and maximum pressure of 14 cm H20. 1 each 0    acetaminophen (TYLENOL) 325 MG tablet Take 650 mg by mouth every 6 hours as needed for Pain      ferrous sulfate 325 (65 Fe) MG tablet Take 325 mg by mouth 3 times daily (with meals)       labetalol (NORMODYNE) 100 MG tablet Take 100 mg by mouth 3 times daily       pantoprazole (PROTONIX) 40 MG tablet Take 40 mg by mouth      lamoTRIgine (LAMICTAL) 100 MG tablet Take 100 mg by mouth 2 times daily       risperiDONE (RISPERDAL) 1 MG tablet Take 1 mg by mouth every evening  60 tablet 2    LORazepam (ATIVAN) 1 MG tablet Take 1 tablet by mouth 3 times daily as needed      NASONEX 50 MCG/ACT nasal spray USE TWO SPRAY(S) IN EACH NOSTRIL ONCE DAILY 1 Inhaler 3    levothyroxine (SYNTHROID) 88 MCG tablet Take 88 mcg by mouth daily       fexofenadine (ALLEGRA) 60 MG tablet Take 180 mg by mouth daily.          Vitals     /82 (Site: Left Upper Arm, Position: Sitting, Cuff Size: Medium Adult)   Pulse 76   Temp 97.6 °F (36.4 °C) (Temporal)   Wt 230 lb (104.3 kg)   SpO2 96%   BMI 37.12 kg/m²  Wt Readings from Last 3 Encounters:   01/25/22 230 lb (104.3 kg)   07/15/21 216 lb (98 kg)   04/22/21 225 lb (102.1 kg)        Physical Exam     General -- no distress  Lungs -- clear  Heart -- S1, S2 heard, JVD - no  Abdomen - soft, non-tender  Extremities --no significant edema. CNS - awake and alert    Labs, Radiology and Tests    Labs -    9/18 3/19 11/19 6/20 7/21 1/22      Potassium 4.4 4.3 4.2 4.3 4.0 4.2      BUN 19 15 17 13 16 16      Creatinine 1.8 1.4 1.3 1.2 1.5 1.4      eGFR 48  68 75 58 62                  UPCR     250       UMCR     101                     Renal Ultrasound Scan        Assessment    1. Renal - chronic kidney disease stage III with a recent history of kidney transplant 12/2017  - GFR is much better at this time after stopping gengraf now back to his baseline .   - He is currently on mycophenolate  540 BID and also zotress 1 mg BID  2. essential hypertension-running well continue current medications. 3. Chronic sinusitis - resolved . 4. Gout on allopurinol  5. Sinus infection - on abx .   6. FU in in 6 months     Tests and orders placed this Encounter     No orders of the defined types were placed in this encounter. Rashad Calderon M.D  [facility].

## 2022-01-25 NOTE — PROGRESS NOTES
Sinus infection X2  First treated amoxicillin   2nd Doxycyline and prednisone x 4 days    Need to confirm dose of Everolimus

## 2022-05-03 NOTE — PROGRESS NOTES
[place] [facility]      Duane Worrell         [id number]  5/4/2022   Chief Complaint   Patient presents with    Follow-up     1 year broderick with jenelle rivas        Pt of Dr. Noelle Arreola    PAP Download:   Original or initial AHI: 7.5    Date of initial study: 7/11/18      Compliant  100%     Noncompliant 0 %     PAP Type AutoSet    Level  10/16   Avg Hrs/Day 7hr 11min  AHI: 1.1   Recorded compliance dates: 4/3/22-5/2/22  Machine/Mfg:   [x] ResMed    [] Respironics/Dreamstation   Interface:   [] Nasal    [x] Nasal pillows   [] FFM      Provider:      [] -TOÑITO     []Rachel     [] Shravan    [x] Lalo Ambriz (Mario Amin)   [] Schwietermans               [] [employer]R Medical      [] Adaptive    [] [address]      [] Other    Neck Size: 16.5  Mallampati 4  ESS:  2  SAQLI: 75    Here is a scan of the most recent download:            Presentation:   Broderick Joshi presents for sleep medicine follow up for obstructive sleep apnea  Accompanied by his mother , pt has cerebral palsy and mentally disabled. Most of HPI given by mother   Since the last visit, Broderick Joshi recently set up on BiPAP , is doing well with therapy, tolerating pressures and mask. Likes the nasal pillows   Has trouble staying asleep some nights, but overall is averaging 7 hours of use most nights with PAP on   Persistent abd pain in evenings, was problem before getting started on PAP . Has been seen by PCP and gen surgery for this, unable to find source of pain   Equipment issues: The pressure is  acceptable, the mask is acceptable     Sleep issues:  Do you feel better? Yes  More rested? Yes   Better concentration? NA    Progress History:   Since last visit any new medical issues? No  New ER or hospital visits? No  Any new or changes in medicines? No  Any new sleep medicines? No    Review of Systems -   Review of Systems   Constitutional: Negative for activity change, appetite change, chills, fatigue, fever and unexpected weight change. HENT: Negative. Eyes: Negative. Respiratory: Negative for cough, shortness of breath and wheezing. Cardiovascular: Negative for chest pain, palpitations and leg swelling. Gastrointestinal: Positive for abdominal pain. Negative for diarrhea, nausea and vomiting. Genitourinary: Negative. Musculoskeletal: Negative. Skin: Negative. Neurological: Negative. Hematological: Negative. Psychiatric/Behavioral: Positive for sleep disturbance. The patient is nervous/anxious. Physical Exam:    BMI:  Body mass index is 37.64 kg/m². Wt Readings from Last 3 Encounters:   05/04/22 233 lb 3.2 oz (105.8 kg)   01/25/22 230 lb (104.3 kg)   07/15/21 216 lb (98 kg)     Weight stable / unchanged  Vitals: /84 (Site: Left Upper Arm, Position: Sitting, Cuff Size: Medium Adult)   Pulse 64   Temp 98.6 °F (37 °C) (Oral)   Ht 5' 6\" (1.676 m)   Wt 233 lb 3.2 oz (105.8 kg)   SpO2 97%   BMI 37.64 kg/m²       Physical Exam  Vitals and nursing note reviewed. Constitutional:       Appearance: Normal appearance. He is overweight. HENT:      Head: Normocephalic and atraumatic. Mouth/Throat:      Pharynx: Oropharynx is clear. Eyes:      Conjunctiva/sclera: Conjunctivae normal.   Pulmonary:      Effort: Pulmonary effort is normal. No tachypnea, bradypnea or respiratory distress. Skin:     Findings: No erythema or rash. Neurological:      Mental Status: He is alert and oriented to person, place, and time. Psychiatric:         Attention and Perception: Attention normal.         Mood and Affect: Mood normal.         Speech: Speech normal.         Behavior: Behavior normal.         Thought Content: Thought content normal.         Cognition and Memory: Cognition normal.         Judgment: Judgment normal.           ASSESSMENT/DIAGNOSIS     Diagnosis Orders   1. FARIHA treated with BiPAP     2. Essential hypertension, benign     3. Cerebral palsy, unspecified type ([address]     4. Mental developmental delay     5. History of renal transplant          Plan   Do you need any equipment today? No    - Download reviewed and discussed with patient  - He  was advised to continue current positive airway pressure therapy with above described pressure. - He  advised to keep good compliance with current recommended pressure to get optimal results and clinical improvement  - Recommend 7-9 hours of sleep with PAP  - He was advised to call [employer] regarding supplies if needed.   -He call my office for earlier appointment if needed for worsening of sleep symptoms.   - Milton Martínez was educated about my impression and plan. Patient verbalizesunderstanding.   We will see Sofia Montanez back in: 6 months with download    Information added by my medical assistant/LPN was reviewed today    Electronically signed by LOTTIE Power CNP on 5/4/2022 at 8:59 AM

## 2022-05-04 ENCOUNTER — OFFICE VISIT (OUTPATIENT)
Dept: PULMONOLOGY | Age: 32
End: 2022-05-04
Payer: COMMERCIAL

## 2022-05-04 VITALS
DIASTOLIC BLOOD PRESSURE: 84 MMHG | BODY MASS INDEX: 37.48 KG/M2 | HEART RATE: 64 BPM | SYSTOLIC BLOOD PRESSURE: 124 MMHG | TEMPERATURE: 98.6 F | WEIGHT: 233.2 LBS | HEIGHT: 66 IN | OXYGEN SATURATION: 97 %

## 2022-05-04 DIAGNOSIS — F81.9 MENTAL DEVELOPMENTAL DELAY: ICD-10-CM

## 2022-05-04 DIAGNOSIS — G47.33 OSA TREATED WITH BIPAP: Primary | ICD-10-CM

## 2022-05-04 DIAGNOSIS — G80.9 CEREBRAL PALSY, UNSPECIFIED TYPE (HCC): ICD-10-CM

## 2022-05-04 DIAGNOSIS — I10 ESSENTIAL HYPERTENSION, BENIGN: ICD-10-CM

## 2022-05-04 DIAGNOSIS — Z94.0 HISTORY OF RENAL TRANSPLANT: ICD-10-CM

## 2022-05-04 PROBLEM — E03.4 ACQUIRED ATROPHY OF THYROID: Status: ACTIVE | Noted: 2022-05-04

## 2022-05-04 PROBLEM — E78.2 MIXED HYPERLIPIDEMIA: Status: ACTIVE | Noted: 2022-05-04

## 2022-05-04 PROBLEM — N18.31 CHRONIC KIDNEY DISEASE, STAGE 3A (HCC): Status: ACTIVE | Noted: 2021-08-26

## 2022-05-04 PROBLEM — D50.9 IRON DEFICIENCY ANEMIA: Status: ACTIVE | Noted: 2022-05-04

## 2022-05-04 PROBLEM — F79 MENTAL DISABILITY: Status: ACTIVE | Noted: 2022-05-04

## 2022-05-04 PROCEDURE — G8427 DOCREV CUR MEDS BY ELIG CLIN: HCPCS | Performed by: NURSE PRACTITIONER

## 2022-05-04 PROCEDURE — 1036F TOBACCO NON-USER: CPT | Performed by: NURSE PRACTITIONER

## 2022-05-04 PROCEDURE — G8417 CALC BMI ABV UP PARAM F/U: HCPCS | Performed by: NURSE PRACTITIONER

## 2022-05-04 PROCEDURE — 99213 OFFICE O/P EST LOW 20 MIN: CPT | Performed by: NURSE PRACTITIONER

## 2022-05-04 ASSESSMENT — ENCOUNTER SYMPTOMS
VOMITING: 0
DIARRHEA: 0
NAUSEA: 0
EYES NEGATIVE: 1
COUGH: 0
SHORTNESS OF BREATH: 0
ABDOMINAL PAIN: 1
WHEEZING: 0

## 2022-06-27 LAB
BASOPHILS ABSOLUTE: NORMAL
BASOPHILS RELATIVE PERCENT: NORMAL
BILIRUBIN, URINE: NEGATIVE
BLOOD, URINE: POSITIVE
BUN BLDV-MCNC: 17 MG/DL
CALCIUM SERPL-MCNC: 9.5 MG/DL
CHLORIDE BLD-SCNC: 103 MMOL/L
CLARITY: CLEAR
CO2: 26 MMOL/L
COLOR: YELLOW
CREAT SERPL-MCNC: 1.22 MG/DL
EOSINOPHILS ABSOLUTE: NORMAL
EOSINOPHILS RELATIVE PERCENT: NORMAL
GFR CALCULATED: 60
GLUCOSE BLD-MCNC: 103 MG/DL
GLUCOSE URINE: NORMAL
HCT VFR BLD CALC: 41.3 % (ref 41–53)
HEMOGLOBIN: 13.9 G/DL (ref 13.5–17.5)
KETONES, URINE: NEGATIVE
LEUKOCYTE ESTERASE, URINE: NEGATIVE
LYMPHOCYTES ABSOLUTE: NORMAL
LYMPHOCYTES RELATIVE PERCENT: NORMAL
MCH RBC QN AUTO: NORMAL PG
MCHC RBC AUTO-ENTMCNC: NORMAL G/DL
MCV RBC AUTO: NORMAL FL
MONOCYTES ABSOLUTE: NORMAL
MONOCYTES RELATIVE PERCENT: NORMAL
NEUTROPHILS ABSOLUTE: NORMAL
NEUTROPHILS RELATIVE PERCENT: NORMAL
NITRITE, URINE: NEGATIVE
PH UA: 6.5 (ref 4.5–8)
PLATELET # BLD: 358 K/ΜL
PMV BLD AUTO: NORMAL FL
POTASSIUM SERPL-SCNC: 3.9 MMOL/L
PROTEIN UA: NEGATIVE
RBC # BLD: 4.17 10^6/ΜL
SODIUM BLD-SCNC: 137 MMOL/L
SPECIFIC GRAVITY, URINE: 1.01
UROBILINOGEN, URINE: NORMAL
WBC # BLD: 11.6 10^3/ML

## 2022-06-30 ENCOUNTER — TELEPHONE (OUTPATIENT)
Dept: NEPHROLOGY | Age: 32
End: 2022-06-30

## 2022-07-25 LAB
ALBUMIN SERPL-MCNC: 3.4 G/DL
ALP BLD-CCNC: 99 U/L
ALT SERPL-CCNC: 37 U/L
ANION GAP SERPL CALCULATED.3IONS-SCNC: 12 MMOL/L
AST SERPL-CCNC: 29 U/L
BILIRUB SERPL-MCNC: 0.4 MG/DL (ref 0.1–1.4)
BILIRUBIN, URINE: NEGATIVE
BLOOD, URINE: NORMAL
BUN BLDV-MCNC: 16 MG/DL
CALCIUM SERPL-MCNC: 9 MG/DL
CHLORIDE BLD-SCNC: 108 MMOL/L
CLARITY: CLEAR
CO2: 26 MMOL/L
COLOR: YELLOW
CREAT SERPL-MCNC: 1.4 MG/DL
CREATININE, RANDOM URINE: 82.7
GFR CALCULATED: 62
GLUCOSE BLD-MCNC: 95 MG/DL
GLUCOSE URINE: NEGATIVE
KETONES, URINE: NEGATIVE
LEUKOCYTE ESTERASE, URINE: NEGATIVE
MICROALBUMIN UR-MCNC: 98.3
NITRITE, URINE: NEGATIVE
PH UA: 6 (ref 4.5–8)
POTASSIUM SERPL-SCNC: 4.5 MMOL/L
PROTEIN UA: NEGATIVE
PROTEIN, URINE, RANDOM: 24.8
SODIUM BLD-SCNC: 141 MMOL/L
SPECIFIC GRAVITY, URINE: 1.01
TOTAL PROTEIN: 7
UROBILINOGEN, URINE: NORMAL

## 2022-07-26 ENCOUNTER — OFFICE VISIT (OUTPATIENT)
Dept: NEPHROLOGY | Age: 32
End: 2022-07-26
Payer: COMMERCIAL

## 2022-07-26 VITALS
SYSTOLIC BLOOD PRESSURE: 134 MMHG | HEART RATE: 66 BPM | DIASTOLIC BLOOD PRESSURE: 83 MMHG | HEIGHT: 67 IN | BODY MASS INDEX: 35.16 KG/M2 | OXYGEN SATURATION: 96 % | WEIGHT: 224 LBS | TEMPERATURE: 97.8 F

## 2022-07-26 DIAGNOSIS — Z94.0 KIDNEY TRANSPLANT RECIPIENT: Primary | ICD-10-CM

## 2022-07-26 DIAGNOSIS — I10 ESSENTIAL HYPERTENSION: ICD-10-CM

## 2022-07-26 DIAGNOSIS — N18.31 STAGE 3A CHRONIC KIDNEY DISEASE (HCC): ICD-10-CM

## 2022-07-26 PROCEDURE — 99213 OFFICE O/P EST LOW 20 MIN: CPT | Performed by: INTERNAL MEDICINE

## 2022-07-26 PROCEDURE — G8427 DOCREV CUR MEDS BY ELIG CLIN: HCPCS | Performed by: INTERNAL MEDICINE

## 2022-07-26 PROCEDURE — 1036F TOBACCO NON-USER: CPT | Performed by: INTERNAL MEDICINE

## 2022-07-26 PROCEDURE — G8417 CALC BMI ABV UP PARAM F/U: HCPCS | Performed by: INTERNAL MEDICINE

## 2022-07-26 RX ORDER — EVEROLIMUS TABLETS 0.5 MG/1
0.5 TABLET ORAL 2 TIMES DAILY
Qty: 120 TABLET | Refills: 5 | Status: SHIPPED | OUTPATIENT
Start: 2022-07-26

## 2022-07-26 RX ORDER — MYCOPHENOLIC ACID 180 MG/1
540 TABLET, DELAYED RELEASE ORAL 2 TIMES DAILY
Qty: 180 TABLET | Refills: 3 | Status: SHIPPED | OUTPATIENT
Start: 2022-07-26

## 2022-07-26 NOTE — PROGRESS NOTES
[facility]        Outpatient Follow-Up note         7/26/2022 2:35 PM    Patient Name:   Nancy Lemons  YOB: 1990  Primary Care Physician:  Eladio Mccurdy     Chief Complaint / Reason for follow-up : Follow Up of Kidney Transplant     Interval History :  Patient seen and examined by me. Feels well. No cp. Some SOB. Being treated with abx for sinus infection . Living Kidney Transplant - 12/2017 from aunt at [address] [place] [address]  No Rejections . He is off gengraf   On evorolimus and cell cept  No hospitalizations and no med changes . Past History :  Past Medical History:   Diagnosis Date    Anemia in chronic kidney disease(285.21)     Bowel obstruction (HCC) 1988    Cerebral palsy (HCC)     Chronic kidney disease, stage IV (severe) ([facility] [address]     if develops ESRD would do peritoneal dialysis and possible kidney transplant.     Developmental disorder     Hard of hearing     Hypertension     Hypothyroidism     Mentally disabled     Pre-transplant evaluation for CKD (chronic kidney disease) 04/20/2017    mailed to [facility] Pre Kidney transplant    Proteinuria      Past Surgical History:   Procedure Laterality Date    ABDOMINAL SURGERY      bowel obstruction and repair-1988, g tube placed and removed age 1    ADENOIDECTOMY      EYE SURGERY  age 1    bilateral eyes    TYMPANOSTOMY TUBE PLACEMENT      multiple up to age 4        Medications :     Outpatient Medications Marked as Taking for the 7/26/22 encounter (Office Visit) with King Shona MD   Medication Sig Dispense Refill    Everolimus 0.5 MG TABS Take 0.5 mg by mouth 2 times daily 2 tablets every 12 hours 120 tablet 5    labetalol (NORMODYNE) 100 MG tablet Take 1 tablet by mouth 3 times daily 90 tablet 5    mycophenolate (MYFORTIC) 180 MG DR tablet Take 3 tablets by mouth 2 times daily 180 tablet 3    allopurinol (ZYLOPRIM) 300 MG tablet Take 1 tablet by mouth daily 90 tablet 3    aspirin 81 MG chewable tablet Take 81 mg by mouth daily      Sodium Chloride-Sodium Bicarb (AYR SALINE NASAL RINSE NA) by Nasal route daily      polycarbophil (FIBERCON) 625 MG tablet Take 625 mg by mouth daily       amLODIPine (NORVASC) 5 MG tablet Take 1 tablet by mouth daily 90 tablet 3    furosemide (LASIX) 20 MG tablet Take 1 tablet by mouth daily 90 tablet 3    Cholecalciferol (VITAMIN D) 2000 units CAPS capsule Take by mouth      CPAP Machine MISC by Does not apply route Please change his current Auto CPAP pressure settings to an auto CPAP with minimum pressure of 8 cm H20 and maximum pressure of 14 cm H20. 1 each 0    acetaminophen (TYLENOL) 325 MG tablet Take 650 mg by mouth every 6 hours as needed for Pain      ferrous sulfate 325 (65 Fe) MG tablet Take 325 mg by mouth 3 times daily (with meals)       pantoprazole (PROTONIX) 40 MG tablet Take 40 mg by mouth      lamoTRIgine (LAMICTAL) 100 MG tablet Take 100 mg by mouth 2 times daily       risperiDONE (RISPERDAL) 1 MG tablet Take 1 mg by mouth every evening  60 tablet 2    NASONEX 50 MCG/ACT nasal spray USE TWO SPRAY(S) IN EACH NOSTRIL ONCE DAILY 1 Inhaler 3    levothyroxine (SYNTHROID) 88 MCG tablet Take 88 mcg by mouth daily       fexofenadine (ALLEGRA) 60 MG tablet Take 180 mg by mouth daily. Vitals     /83 (Site: Right Lower Arm, Position: Sitting, Cuff Size: Small Adult)   Pulse 66   Temp 97.8 °F (36.6 °C)   Ht 5' 7\" (1.702 m)   Wt 224 lb (101.6 kg)   SpO2 96%   BMI 35.08 kg/m²  Wt Readings from Last 3 Encounters:   07/26/22 224 lb (101.6 kg)   05/04/22 233 lb 3.2 oz (105.8 kg)   01/25/22 230 lb (104.3 kg)        Physical Exam     General -- no distress  Lungs -- clear  Heart -- S1, S2 heard, JVD - no  Abdomen - soft, non-tender  Extremities --no significant edema.    CNS - awake and alert    Labs, Radiology and Tests    Labs -    9/18 3/19 11/19 6/20 7/21 1/22 7/22     Potassium 4.4 4.3 4.2 4.3 4.0 4.2 4.5     BUN 19 15 17 13 16 16 16     Creatinine 1.8 1.4 1.3 1.2 1.5 1.4 1.4     eGFR 48  68 75 58 62 62                 UPCR     250  300     UMCR     101                     Renal Ultrasound Scan        Assessment    Renal - chronic kidney disease stage III with a recent history of kidney transplant 12/2017  - GFR is much better at this time after stopping gengraf now back to his baseline @  1.4  - He is currently on mycophenolate  540 BID and also zotress 1 mg BID. Zotress level 6.8 (3-8)  essential hypertension-running well continue current medications. Chronic sinusitis - resolved . Gout on allopurinol  Sinus infection - on abx . FU in in 6 months     Tests and orders placed this Encounter     No orders of the defined types were placed in this encounter. Christella Gilford, M.D  [facility].

## 2022-07-28 RX ORDER — LABETALOL 100 MG/1
100 TABLET, FILM COATED ORAL 3 TIMES DAILY
Qty: 90 TABLET | Refills: 5 | Status: SHIPPED | OUTPATIENT
Start: 2022-07-28

## 2022-11-09 ENCOUNTER — OFFICE VISIT (OUTPATIENT)
Dept: PULMONOLOGY | Age: 32
End: 2022-11-09
Payer: COMMERCIAL

## 2022-11-09 VITALS
SYSTOLIC BLOOD PRESSURE: 130 MMHG | BODY MASS INDEX: 37.73 KG/M2 | HEIGHT: 67 IN | WEIGHT: 240.4 LBS | TEMPERATURE: 97.6 F | DIASTOLIC BLOOD PRESSURE: 82 MMHG | HEART RATE: 66 BPM | OXYGEN SATURATION: 98 %

## 2022-11-09 DIAGNOSIS — R53.83 FATIGUE, UNSPECIFIED TYPE: ICD-10-CM

## 2022-11-09 DIAGNOSIS — F81.9 MENTAL DEVELOPMENTAL DELAY: ICD-10-CM

## 2022-11-09 DIAGNOSIS — Z94.0 HISTORY OF RENAL TRANSPLANT: ICD-10-CM

## 2022-11-09 DIAGNOSIS — G47.33 OSA TREATED WITH BIPAP: Primary | ICD-10-CM

## 2022-11-09 DIAGNOSIS — G80.9 CEREBRAL PALSY, UNSPECIFIED TYPE (HCC): ICD-10-CM

## 2022-11-09 PROCEDURE — 3078F DIAST BP <80 MM HG: CPT | Performed by: NURSE PRACTITIONER

## 2022-11-09 PROCEDURE — G8427 DOCREV CUR MEDS BY ELIG CLIN: HCPCS | Performed by: NURSE PRACTITIONER

## 2022-11-09 PROCEDURE — 99214 OFFICE O/P EST MOD 30 MIN: CPT | Performed by: NURSE PRACTITIONER

## 2022-11-09 PROCEDURE — G8417 CALC BMI ABV UP PARAM F/U: HCPCS | Performed by: NURSE PRACTITIONER

## 2022-11-09 PROCEDURE — 1036F TOBACCO NON-USER: CPT | Performed by: NURSE PRACTITIONER

## 2022-11-09 PROCEDURE — G8484 FLU IMMUNIZE NO ADMIN: HCPCS | Performed by: NURSE PRACTITIONER

## 2022-11-09 PROCEDURE — 3074F SYST BP LT 130 MM HG: CPT | Performed by: NURSE PRACTITIONER

## 2022-11-09 NOTE — PROGRESS NOTES
[place] [facility]      Classie Stagers         [id number]  11/9/2022   Chief Complaint   Patient presents with    Follow-up     6 month FARIHA        Pt of Dr. Raya Dodge     PAP Download:   Original or initial AHI: 7.5    Date of initial study: 7/11/18        Compliant  93%     Noncompliant 7 %     PAP Type AutoSet Level  10/16   Avg Hrs/Day 7  AHI: 1.3   Recorded compliance dates : 10/9/22-11/7/22  Machine/Mfg:   [x] ResMed    [] Respironics/Dreamstation   Interface:   [] Nasal    [x] Nasal pillows   [] FFM      Provider:      [] SR-HME     []Rachel     [] [employer]    [x] [address]    [] Schwietermans               [] [employer]R [employer]      [] Adaptive    [] [place]:      [] Other    Neck Size: 16.5  Mallampati 4  ESS:  3  SAQLI: 65    Here is a scan of the most recent download:            Presentation:   Shaun Dueñas presents for sleep medicine follow up for obstructive sleep apnea  Since the last visit, Shaun Dueñas is using his CPAP compliantly. Mother helps him put mask on at night. .  Does have some nights with leaks. .  He had COVID 19 about 2 months ago. He tells his mother he doesn't feel well all the time. This started before COVID,   Is often feeling tired during the day . Mother thinks it could be anxiety or trying to stay home instead of going to group home/ work. Temps have been normal.     Weight gained 8 lbs over 6 months     Equipment issues: The pressure is  acceptable, the mask is acceptable       Progress History:   Since last visit any new medical issues? Yes COVID   New ER or hospital visits? No  Any new or changes in medicines? No  Any new sleep medicines? No    Review of Systems -   Review of Systems     Physical Exam:    BMI:  Body mass index is 37.65 kg/m².     Wt Readings from Last 3 Encounters:   11/09/22 240 lb 6.4 oz (109 kg)   07/26/22 224 lb (101.6 kg)   05/04/22 233 lb 3.2 oz (105.8 kg)     Vitals: /82 (Site: Left Lower Arm, Position: Sitting, Cuff Size: Medium

## 2022-11-17 DIAGNOSIS — G47.33 OSA TREATED WITH BIPAP: ICD-10-CM

## 2022-11-21 ENCOUNTER — TELEPHONE (OUTPATIENT)
Dept: PULMONOLOGY | Age: 32
End: 2022-11-21

## 2022-11-21 NOTE — TELEPHONE ENCOUNTER
----- Message from LOTTIE Armijo CNP sent at 11/18/2022 12:04 PM EST -----  Please notify patient that the overnight pulse ox was normal, oxygen is not dropping at night.

## 2023-01-16 RX ORDER — MYCOPHENOLIC ACID 180 MG/1
TABLET, DELAYED RELEASE ORAL
Qty: 180 TABLET | Refills: 5 | Status: SHIPPED | OUTPATIENT
Start: 2023-01-16 | End: 2023-01-23 | Stop reason: SDUPTHER

## 2023-01-18 LAB
BUN BLDV-MCNC: 17 MG/DL
CALCIUM SERPL-MCNC: 9.6 MG/DL
CHLORIDE BLD-SCNC: 105 MMOL/L
CO2: 28 MMOL/L
CREAT SERPL-MCNC: 1.3 MG/DL
GFR CALCULATED: 67
GLUCOSE BLD-MCNC: 86 MG/DL
POTASSIUM SERPL-SCNC: 4.1 MMOL/L
SODIUM BLD-SCNC: 139 MMOL/L

## 2023-01-23 ENCOUNTER — OFFICE VISIT (OUTPATIENT)
Dept: NEPHROLOGY | Age: 33
End: 2023-01-23
Payer: MEDICARE

## 2023-01-23 VITALS
HEART RATE: 72 BPM | SYSTOLIC BLOOD PRESSURE: 139 MMHG | BODY MASS INDEX: 37.9 KG/M2 | OXYGEN SATURATION: 97 % | WEIGHT: 242 LBS | DIASTOLIC BLOOD PRESSURE: 91 MMHG

## 2023-01-23 DIAGNOSIS — Z94.0 KIDNEY TRANSPLANT RECIPIENT: Primary | ICD-10-CM

## 2023-01-23 DIAGNOSIS — N18.2 CKD (CHRONIC KIDNEY DISEASE), STAGE II: ICD-10-CM

## 2023-01-23 DIAGNOSIS — I10 ESSENTIAL HYPERTENSION: ICD-10-CM

## 2023-01-23 PROCEDURE — G8427 DOCREV CUR MEDS BY ELIG CLIN: HCPCS | Performed by: INTERNAL MEDICINE

## 2023-01-23 PROCEDURE — G8484 FLU IMMUNIZE NO ADMIN: HCPCS | Performed by: INTERNAL MEDICINE

## 2023-01-23 PROCEDURE — 3080F DIAST BP >= 90 MM HG: CPT | Performed by: INTERNAL MEDICINE

## 2023-01-23 PROCEDURE — 3075F SYST BP GE 130 - 139MM HG: CPT | Performed by: INTERNAL MEDICINE

## 2023-01-23 PROCEDURE — G8417 CALC BMI ABV UP PARAM F/U: HCPCS | Performed by: INTERNAL MEDICINE

## 2023-01-23 PROCEDURE — 1036F TOBACCO NON-USER: CPT | Performed by: INTERNAL MEDICINE

## 2023-01-23 PROCEDURE — 99214 OFFICE O/P EST MOD 30 MIN: CPT | Performed by: INTERNAL MEDICINE

## 2023-01-23 RX ORDER — MYCOPHENOLIC ACID 180 MG/1
TABLET, DELAYED RELEASE ORAL
Qty: 180 TABLET | Refills: 5 | Status: SHIPPED | OUTPATIENT
Start: 2023-01-23

## 2023-01-23 RX ORDER — ALLOPURINOL 300 MG/1
300 TABLET ORAL DAILY
Qty: 30 TABLET | Refills: 5 | Status: SHIPPED | OUTPATIENT
Start: 2023-01-23

## 2023-01-23 RX ORDER — LABETALOL 100 MG/1
100 TABLET, FILM COATED ORAL 3 TIMES DAILY
Qty: 90 TABLET | Refills: 5 | Status: SHIPPED | OUTPATIENT
Start: 2023-01-23

## 2023-01-23 NOTE — PROGRESS NOTES
[facility] [place] [facility]        Outpatient Follow-Up note         1/23/2023 3:10 PM    Patient Name:   Dionicio Rios  YOB: 1990  Primary Care Physician:  Constantino Gomes MD     Chief Complaint / Reason for follow-up : Follow Up of Kidney Transplant     Interval History :  Patient seen and examined by me. Feels well. No cp. Some SOB. Recent URI used keflex    Living Kidney Transplant (moms [facility] kidney ) - 12/2017 from aunt at [facility]  No Rejections . He is off gengraf   On evorolimus and cell cept  No hospitalizations and no med changes . Past History :  Past Medical History:   Diagnosis Date    Anemia in chronic kidney disease(285.21)     Bowel obstruction (HCC) 1988    Cerebral palsy (HCC)     Chronic kidney disease, stage IV (severe) ([facility] [address]     if develops ESRD would do peritoneal dialysis and possible kidney transplant. Developmental disorder     Hard of hearing     Hypertension     Hypothyroidism     Mentally disabled     Pre-transplant evaluation for CKD (chronic kidney disease) 04/20/2017    mailed to [facility] Pre Kidney transplant    Proteinuria      Past Surgical History:   Procedure Laterality Date    ABDOMEN SURGERY      bowel obstruction and repair-1988, g tube placed and removed age 1    ADENOIDECTOMY      EYE SURGERY  age 1    bilateral eyes    TYMPANOSTOMY TUBE PLACEMENT      multiple up to age 4        Medications :     Outpatient Medications Marked as Taking for the 1/23/23 encounter (Office Visit) with Christella Gilford, MD   Medication Sig Dispense Refill    mycophenolate (MYFORTIC) 180 MG DR tablet TAKE 3 TABLETS BY MOUTH IN THE MORNING AND TAKE 3 TABLETS BEFORE BEDTIME 180 tablet 5    labetalol (NORMODYNE) 100 MG tablet Take 1 tablet by mouth in the morning and 1 tablet at noon and 1 tablet before bedtime. 90 tablet 5    Everolimus 0.5 MG TABS Take 0.5 mg by mouth in the morning and 0.5 mg before bedtime. 2 tablets every 12 hours.  80 tablet 5    allopurinol (ZYLOPRIM) 300 MG tablet Take 1 tablet by mouth daily 90 tablet 3    aspirin 81 MG chewable tablet Take 81 mg by mouth daily      Sodium Chloride-Sodium Bicarb (AYR SALINE NASAL RINSE NA) by Nasal route daily      polycarbophil (FIBERCON) 625 MG tablet Take 625 mg by mouth daily       amLODIPine (NORVASC) 5 MG tablet Take 1 tablet by mouth daily 90 tablet 3    furosemide (LASIX) 20 MG tablet Take 1 tablet by mouth daily 90 tablet 3    Cholecalciferol (VITAMIN D) 2000 units CAPS capsule Take by mouth      CPAP Machine MISC by Does not apply route Please change his current Auto CPAP pressure settings to an auto CPAP with minimum pressure of 8 cm H20 and maximum pressure of 14 cm H20. 1 each 0    acetaminophen (TYLENOL) 325 MG tablet Take 650 mg by mouth every 6 hours as needed for Pain      ferrous sulfate 325 (65 Fe) MG tablet Take 325 mg by mouth 3 times daily (with meals)       pantoprazole (PROTONIX) 40 MG tablet Take 40 mg by mouth      lamoTRIgine (LAMICTAL) 100 MG tablet Take 200 mg by mouth 2 times daily      risperiDONE (RISPERDAL) 1 MG tablet Take 2 mg by mouth every evening 60 tablet 2    NASONEX 50 MCG/ACT nasal spray USE TWO SPRAY(S) IN EACH NOSTRIL ONCE DAILY 1 Inhaler 3    levothyroxine (SYNTHROID) 88 MCG tablet Take 88 mcg by mouth daily       fexofenadine (ALLEGRA) 60 MG tablet Take 180 mg by mouth daily. Vitals     BP (!) 139/91 (Site: Left Upper Arm, Position: Sitting, Cuff Size: Large Adult)   Pulse 72   Wt 242 lb (109.8 kg)   SpO2 97%   BMI 37.90 kg/m²  Wt Readings from Last 3 Encounters:   01/23/23 242 lb (109.8 kg)   11/09/22 240 lb 6.4 oz (109 kg)   07/26/22 224 lb (101.6 kg)        Physical Exam     General -- no distress  Lungs -- clear  Heart -- S1, S2 heard, JVD - no  Abdomen - soft, non-tender  Extremities --no significant edema.    CNS - awake and alert    Labs, Radiology and Tests    Labs -    9/18 3/19 11/19 6/20 7/21 1/22 7/22 1/23    Potassium 4.4 4.3 4.2 4.3 4.0 4.2 4.5 4.1    BUN 19 15 17 13 16 16 16 17    Creatinine 1.8 1.4 1.3 1.2 1.5 1.4 1.4 1.3    eGFR 48  68 75 58 62 62 67                UPCR     250  300     UMCR     101                     Renal Ultrasound Scan        Assessment    Renal - chronic kidney disease stage II with a recent history of kidney transplant 12/2017  - GFR is much better at this time after stopping gengraf now back to his baseline @  1.3  - He is currently on mycophenolate  540 BID and also zotress 1 mg BID. Zotress level 4.1 (3-8)  essential hypertension-running well continue current medications. Chronic sinusitis - resolved . Gout on allopurinol  Sinus infection - on abx . FU in in 6 months     Tests and orders placed this Encounter     Orders Placed This Encounter   Procedures    Comprehensive Metabolic Panel    Urinalysis with Microscopic    Creatinine, Random Urine    MICROALBUMIN, RANDOM URINE (W/O CREATININE)    Protein, urine, random    Miscellaneous Bossman Ronquillo M.D  [facility].

## 2023-05-17 RX ORDER — EVEROLIMUS TABLETS 0.5 MG/1
TABLET ORAL
Qty: 120 TABLET | Refills: 11 | Status: SHIPPED | OUTPATIENT
Start: 2023-05-17

## 2023-06-21 LAB
BASOPHILS ABSOLUTE: 0.05 /ΜL
BASOPHILS RELATIVE PERCENT: 0.5 %
EOSINOPHILS ABSOLUTE: 0.17 /ΜL
EOSINOPHILS RELATIVE PERCENT: 1.7 %
HCT VFR BLD CALC: 44.2 % (ref 41–53)
HEMOGLOBIN: 14.1 G/DL (ref 13.5–17.5)
IRON: 59
LYMPHOCYTES ABSOLUTE: 1.61 /ΜL
LYMPHOCYTES RELATIVE PERCENT: 16.4 %
MCH RBC QN AUTO: 26.7 PG
MCHC RBC AUTO-ENTMCNC: 31.9 G/DL
MCV RBC AUTO: 83.6 FL
MONOCYTES ABSOLUTE: 0.79 /ΜL
MONOCYTES RELATIVE PERCENT: 8 %
NEUTROPHILS ABSOLUTE: 7.13 /ΜL
NEUTROPHILS RELATIVE PERCENT: 72.7 %
PLATELET # BLD: 350 K/ΜL
PMV BLD AUTO: 9.3 FL
RBC # BLD: 5.29 10^6/ΜL
TOTAL IRON BINDING CAPACITY: 223
WBC # BLD: 9.82 10^3/ML

## 2023-07-19 LAB
ALBUMIN SERPL-MCNC: 3.8 G/DL
ALP BLD-CCNC: 94 U/L
ALT SERPL-CCNC: 35 U/L
ANION GAP SERPL CALCULATED.3IONS-SCNC: 13 MMOL/L
AST SERPL-CCNC: 37 U/L
BILIRUB SERPL-MCNC: 0.5 MG/DL (ref 0.1–1.4)
BILIRUBIN, URINE: NEGATIVE
BLOOD, URINE: POSITIVE
BUN BLDV-MCNC: 17 MG/DL
CALCIUM SERPL-MCNC: 9.4 MG/DL
CHLORIDE BLD-SCNC: 102 MMOL/L
CLARITY: CLEAR
CO2: 27 MMOL/L
COLOR: YELLOW
CREAT SERPL-MCNC: 1.2 MG/DL
CREATININE, URINE: 49
EGFR: 74
GLUCOSE BLD-MCNC: 106 MG/DL
GLUCOSE URINE: NORMAL
KETONES, URINE: NEGATIVE
LEUKOCYTE ESTERASE, URINE: NEGATIVE
MICROALBUMIN/CREAT 24H UR: 51.7 MG/G{CREAT}
MICROALBUMIN/CREAT UR-RTO: NORMAL
NITRITE, URINE: NEGATIVE
PH UA: 6 (ref 4.5–8)
POTASSIUM SERPL-SCNC: 3.8 MMOL/L
PROTEIN UA: NEGATIVE
SODIUM BLD-SCNC: 138 MMOL/L
SPECIFIC GRAVITY, URINE: 1.01
TOTAL PROTEIN: 6.5
UROBILINOGEN, URINE: NORMAL

## 2023-07-25 ENCOUNTER — OFFICE VISIT (OUTPATIENT)
Dept: NEPHROLOGY | Age: 33
End: 2023-07-25
Payer: MEDICARE

## 2023-07-25 VITALS
OXYGEN SATURATION: 97 % | HEART RATE: 74 BPM | DIASTOLIC BLOOD PRESSURE: 90 MMHG | BODY MASS INDEX: 38.06 KG/M2 | SYSTOLIC BLOOD PRESSURE: 132 MMHG | WEIGHT: 243 LBS

## 2023-07-25 DIAGNOSIS — I10 ESSENTIAL HYPERTENSION: ICD-10-CM

## 2023-07-25 DIAGNOSIS — Z94.0 KIDNEY TRANSPLANT RECIPIENT: Primary | ICD-10-CM

## 2023-07-25 DIAGNOSIS — N18.2 CKD (CHRONIC KIDNEY DISEASE), STAGE II: ICD-10-CM

## 2023-07-25 PROCEDURE — G8427 DOCREV CUR MEDS BY ELIG CLIN: HCPCS | Performed by: INTERNAL MEDICINE

## 2023-07-25 PROCEDURE — 3075F SYST BP GE 130 - 139MM HG: CPT | Performed by: INTERNAL MEDICINE

## 2023-07-25 PROCEDURE — 99214 OFFICE O/P EST MOD 30 MIN: CPT | Performed by: INTERNAL MEDICINE

## 2023-07-25 PROCEDURE — 3080F DIAST BP >= 90 MM HG: CPT | Performed by: INTERNAL MEDICINE

## 2023-07-25 PROCEDURE — G8417 CALC BMI ABV UP PARAM F/U: HCPCS | Performed by: INTERNAL MEDICINE

## 2023-07-25 PROCEDURE — 1036F TOBACCO NON-USER: CPT | Performed by: INTERNAL MEDICINE

## 2023-07-25 NOTE — PROGRESS NOTES
[facility]        Outpatient Follow-Up note         7/25/2023 3:08 PM    Patient Name:   Raul Pires  YOB: 1990  Primary Care Physician:  Christiano Lawrence MD     Chief Complaint / Reason for follow-up : Follow Up of Kidney Transplant     Interval History :  Patient seen and examined by me. Feels well. No cp. Some SOB. Recent URI used keflex    Living Kidney Transplant (moms [facility] kidney ) - 12/2017 from aunt at [facility]  No Rejections . He is off gengraf   On evorolimus and cell cept  No hospitalizations and no med changes . Past History :  Past Medical History:   Diagnosis Date    Anemia in chronic kidney disease(285.21)     Bowel obstruction (HCC) 1988    Cerebral palsy (HCC)     Chronic kidney disease, stage IV (severe) ([address])     if develops ESRD would do peritoneal dialysis and possible kidney transplant.     Developmental disorder     Hard of hearing     Hypertension     Hypothyroidism     Mentally disabled     Pre-transplant evaluation for CKD (chronic kidney disease) 04/20/2017    mailed to [facility] Pre Kidney transplant    Proteinuria      Past Surgical History:   Procedure Laterality Date    ABDOMEN SURGERY      bowel obstruction and repair-1988, g tube placed and removed age 1    ADENOIDECTOMY      EYE SURGERY  age 1    bilateral eyes    TYMPANOSTOMY TUBE PLACEMENT      multiple up to age 4        Medications :     Outpatient Medications Marked as Taking for the 7/25/23 encounter (Office Visit) with Angie Ocasio MD   Medication Sig Dispense Refill    mycophenolate (MYFORTIC) 180 MG DR tablet TAKE 3 TABLETS BY MOUTH IN THE MORNING AND TAKE 3 TABLETS BEFORE BEDTIME 180 tablet 5    Sodium Chloride-Sodium Bicarb (AYR SALINE NASAL RINSE NA) by Nasal route daily      polycarbophil (FIBERCON) 625 MG tablet Take 1 tablet by mouth daily      pantoprazole (PROTONIX) 40 MG tablet Take 1 tablet by mouth      lamoTRIgine (LAMICTAL) 100 MG

## 2023-08-01 RX ORDER — LABETALOL 100 MG/1
TABLET, FILM COATED ORAL
Qty: 90 TABLET | Refills: 0 | Status: SHIPPED | OUTPATIENT
Start: 2023-08-01 | End: 2023-09-25

## 2023-09-18 RX ORDER — ALLOPURINOL 300 MG/1
300 TABLET ORAL DAILY
Qty: 30 TABLET | Refills: 0 | Status: SHIPPED | OUTPATIENT
Start: 2023-09-18 | End: 2023-11-14

## 2023-09-25 RX ORDER — LABETALOL 100 MG/1
100 TABLET, FILM COATED ORAL 3 TIMES DAILY
Qty: 90 TABLET | Refills: 0 | Status: SHIPPED | OUTPATIENT
Start: 2023-09-25 | End: 2023-11-20

## 2023-11-07 NOTE — PROGRESS NOTES
[place] [facility]      Kusum Wise         [id number]  11/8/2023   Chief Complaint   Patient presents with    Follow-up     1yr FARIHA f/u w/[facility] download. Doing well. Needs script for PAP supplies. Pt of Dr. Ian Santoyo     PAP Download:   Original or initial AHI: 7.5    Date of initial study: 7/11/2018          Compliant  93%     Noncompliant 7 %     PAP Type APAP    Level  10/16 cmH2O   Avg Hrs/Day 7hrs 24mins  AHI: 1.4   Leaks : 95 th percentile: 16.6   Recorded compliance dates , 10/7/23  to 11/5/23   Machine/Mfg:   [x] ResMed    [] Respironics/Dreamstation   Interface:   [] Nasal    [x] Nasal pillows   [] FFM      Provider:      [] SR-HME     [][employer]     [] [employer]    [x] Bianca Captain    [] Schwiet[place]ans               [] [employer]      [] Adaptive    [] [address] [facility] [address]:      [] Other    Neck Size: 18 inches  Mallampati 4  ESS:  3  SAQLI: 86    Here is a scan of the most recent download:              Presentation:   Lashon Paiz presents for 1 yearsle medicine follow up for obstructive sleep apnea  Accompanied by his mother, HPI given by his mother   Since the last visit, Lashon Paiz doing great with therapy, mom will help him get the mask on at times. Seems to be getting benefit. Difficult to assess due to mental delay. His mother states he is overall sleeping well, not complaining about mask and is not napping       Progress History:   Since last visit any new medical issues? No  Any trouble with Machine No  Any new sleep medicines? no  Trouble Falling Asleep No  Trouble Staying Asleep No  Snoring no    Equipment issues: The pressure is  acceptable, the mask is acceptable     Review of Systems -   Review of Systems   Constitutional:  Negative for activity change, appetite change, chills, fatigue, fever and unexpected weight change. HENT:  Positive for congestion and ear pain. Eyes: Negative. Respiratory:  Negative for cough, shortness of breath and wheezing.

## 2023-11-08 ENCOUNTER — OFFICE VISIT (OUTPATIENT)
Dept: PULMONOLOGY | Age: 33
End: 2023-11-08
Payer: COMMERCIAL

## 2023-11-08 VITALS
OXYGEN SATURATION: 97 % | HEART RATE: 65 BPM | HEIGHT: 67 IN | SYSTOLIC BLOOD PRESSURE: 106 MMHG | DIASTOLIC BLOOD PRESSURE: 76 MMHG | BODY MASS INDEX: 36.76 KG/M2 | TEMPERATURE: 98.3 F | WEIGHT: 234.2 LBS

## 2023-11-08 DIAGNOSIS — J30.2 SEASONAL ALLERGIC RHINITIS, UNSPECIFIED TRIGGER: ICD-10-CM

## 2023-11-08 DIAGNOSIS — F81.9 MENTAL DEVELOPMENTAL DELAY: ICD-10-CM

## 2023-11-08 DIAGNOSIS — G47.33 OSA TREATED WITH BIPAP: Primary | ICD-10-CM

## 2023-11-08 PROBLEM — J30.9 ALLERGIC RHINITIS: Status: ACTIVE | Noted: 2023-05-16

## 2023-11-08 PROBLEM — R73.03 PREDIABETES: Status: ACTIVE | Noted: 2023-11-08

## 2023-11-08 PROCEDURE — 1036F TOBACCO NON-USER: CPT | Performed by: NURSE PRACTITIONER

## 2023-11-08 PROCEDURE — 3074F SYST BP LT 130 MM HG: CPT | Performed by: NURSE PRACTITIONER

## 2023-11-08 PROCEDURE — G8484 FLU IMMUNIZE NO ADMIN: HCPCS | Performed by: NURSE PRACTITIONER

## 2023-11-08 PROCEDURE — G8427 DOCREV CUR MEDS BY ELIG CLIN: HCPCS | Performed by: NURSE PRACTITIONER

## 2023-11-08 PROCEDURE — 99214 OFFICE O/P EST MOD 30 MIN: CPT | Performed by: NURSE PRACTITIONER

## 2023-11-08 PROCEDURE — G8417 CALC BMI ABV UP PARAM F/U: HCPCS | Performed by: NURSE PRACTITIONER

## 2023-11-08 PROCEDURE — 3078F DIAST BP <80 MM HG: CPT | Performed by: NURSE PRACTITIONER

## 2023-11-08 ASSESSMENT — ENCOUNTER SYMPTOMS
NAUSEA: 0
WHEEZING: 0
VOMITING: 0
DIARRHEA: 0
COUGH: 0
EYES NEGATIVE: 1
ABDOMINAL PAIN: 0
SHORTNESS OF BREATH: 0

## 2023-11-14 RX ORDER — ALLOPURINOL 300 MG/1
300 TABLET ORAL DAILY
Qty: 30 TABLET | Refills: 0 | Status: SHIPPED | OUTPATIENT
Start: 2023-11-14

## 2023-11-20 RX ORDER — MYCOPHENOLIC ACID 180 MG/1
TABLET, DELAYED RELEASE ORAL
Qty: 165 TABLET | Refills: 0 | Status: SHIPPED | OUTPATIENT
Start: 2023-11-20 | End: 2024-01-15

## 2023-11-20 RX ORDER — LABETALOL 100 MG/1
100 TABLET, FILM COATED ORAL 3 TIMES DAILY
Qty: 90 TABLET | Refills: 0 | Status: SHIPPED | OUTPATIENT
Start: 2023-11-20

## 2024-01-15 RX ORDER — ALLOPURINOL 300 MG/1
300 TABLET ORAL DAILY
Qty: 30 TABLET | Refills: 5 | Status: SHIPPED | OUTPATIENT
Start: 2024-01-15

## 2024-01-15 RX ORDER — MYCOPHENOLIC ACID 180 MG/1
TABLET, DELAYED RELEASE ORAL
Qty: 180 TABLET | Refills: 5 | Status: SHIPPED | OUTPATIENT
Start: 2024-01-15

## 2024-01-29 RX ORDER — LABETALOL 100 MG/1
100 TABLET, FILM COATED ORAL 3 TIMES DAILY
Qty: 90 TABLET | Refills: 0 | Status: SHIPPED | OUTPATIENT
Start: 2024-01-29

## 2024-02-06 LAB
BASOPHILS ABSOLUTE: 0.05 /ΜL
BASOPHILS RELATIVE PERCENT: 0.6 %
BUN BLDV-MCNC: 17 MG/DL
CALCIUM SERPL-MCNC: 9.4 MG/DL
CHLORIDE BLD-SCNC: 109 MMOL/L
CO2: 26 MMOL/L
CREAT SERPL-MCNC: 1.1 MG/DL
EGFR: 81
EOSINOPHILS ABSOLUTE: 0.14 /ΜL
EOSINOPHILS RELATIVE PERCENT: 1.6 %
ESTIMATED AVERAGE GLUCOSE: 111
GLUCOSE BLD-MCNC: 105 MG/DL
HBA1C MFR BLD: 5.5 %
HCT VFR BLD CALC: 43 % (ref 41–53)
HEMOGLOBIN: 13.7 G/DL (ref 13.5–17.5)
IRON: 65
LYMPHOCYTES ABSOLUTE: 1.19 /ΜL
LYMPHOCYTES RELATIVE PERCENT: 14 %
MCH RBC QN AUTO: 27 PG
MCHC RBC AUTO-ENTMCNC: 31.9 G/DL
MCV RBC AUTO: 84.8 FL
MONOCYTES ABSOLUTE: 0.62 /ΜL
MONOCYTES RELATIVE PERCENT: 7.3 %
NEUTROPHILS ABSOLUTE: 6.45 /ΜL
NEUTROPHILS RELATIVE PERCENT: 75.7 %
PDW BLD-RTO: 43.8 %
PLATELET # BLD: 319 K/ΜL
PMV BLD AUTO: 9.1 FL
POTASSIUM SERPL-SCNC: 4.2 MMOL/L
RBC # BLD: 5.07 10^6/ΜL
SODIUM BLD-SCNC: 142 MMOL/L
TOTAL IRON BINDING CAPACITY: 215
WBC # BLD: 8.52 10^3/ML

## 2024-02-12 ENCOUNTER — OFFICE VISIT (OUTPATIENT)
Dept: NEPHROLOGY | Age: 34
End: 2024-02-12
Payer: COMMERCIAL

## 2024-02-12 VITALS
DIASTOLIC BLOOD PRESSURE: 82 MMHG | HEART RATE: 74 BPM | SYSTOLIC BLOOD PRESSURE: 129 MMHG | BODY MASS INDEX: 36.49 KG/M2 | WEIGHT: 233 LBS | OXYGEN SATURATION: 97 %

## 2024-02-12 DIAGNOSIS — N18.2 CKD (CHRONIC KIDNEY DISEASE), STAGE II: ICD-10-CM

## 2024-02-12 DIAGNOSIS — Z94.0 KIDNEY TRANSPLANT RECIPIENT: Primary | ICD-10-CM

## 2024-02-12 DIAGNOSIS — I10 ESSENTIAL HYPERTENSION: ICD-10-CM

## 2024-02-12 PROCEDURE — 3074F SYST BP LT 130 MM HG: CPT | Performed by: INTERNAL MEDICINE

## 2024-02-12 PROCEDURE — G8427 DOCREV CUR MEDS BY ELIG CLIN: HCPCS | Performed by: INTERNAL MEDICINE

## 2024-02-12 PROCEDURE — 99214 OFFICE O/P EST MOD 30 MIN: CPT | Performed by: INTERNAL MEDICINE

## 2024-02-12 PROCEDURE — G8484 FLU IMMUNIZE NO ADMIN: HCPCS | Performed by: INTERNAL MEDICINE

## 2024-02-12 PROCEDURE — 3079F DIAST BP 80-89 MM HG: CPT | Performed by: INTERNAL MEDICINE

## 2024-02-12 PROCEDURE — G8417 CALC BMI ABV UP PARAM F/U: HCPCS | Performed by: INTERNAL MEDICINE

## 2024-02-12 PROCEDURE — 1036F TOBACCO NON-USER: CPT | Performed by: INTERNAL MEDICINE

## 2024-02-12 RX ORDER — FAMOTIDINE 20 MG/1
20 TABLET, FILM COATED ORAL
COMMUNITY
Start: 2023-07-06

## 2024-02-12 NOTE — PROGRESS NOTES
[facility]        Outpatient Follow-Up note         2/12/2024 1:59 PM    Patient Name:   Jayjay Johnson  YOB: 1990  Primary Care Physician:  Jovon Liz MD     Chief Complaint / Reason for follow-up : Follow Up of Kidney Transplant     Interval History :  Patient seen and examined by me. Feels well.  No cp. Some SOB.  Recent URI used keflex    Living Kidney Transplant (moms sisters kidney ) - 12/2017 from aunt at [facility]  No Rejections . He is off gengraf   On evorolimus and cell cept  No hospitalizations and no med changes .      Past History :  Past Medical History:   Diagnosis Date    Anemia in chronic kidney disease(285.21)     Bowel obstruction (HCC) 1988    Cerebral palsy (HCC)     Chronic kidney disease, stage IV (severe) (HCC)     if develops ESRD would do peritoneal dialysis and possible kidney transplant.    Developmental disorder     Hard of hearing     Hypertension     Hypothyroidism     Mentally disabled     Pre-transplant evaluation for CKD (chronic kidney disease) 04/20/2017    mailed to [facility] Pre Kidney transplant    Proteinuria      Past Surgical History:   Procedure Laterality Date    ABDOMEN SURGERY      bowel obstruction and repair-1988, g tube placed and removed age 2    ADENOIDECTOMY      EYE SURGERY  age 1    bilateral eyes    TYMPANOSTOMY TUBE PLACEMENT      multiple up to age 6        Medications :     Outpatient Medications Marked as Taking for the 2/12/24 encounter (Office Visit) with Stephen Freeman MD   Medication Sig Dispense Refill    famotidine (PEPCID) 20 MG tablet 1 tablet      labetalol (NORMODYNE) 100 MG tablet TAKE 1 TABLET BY MOUTH THREE TIMES DAILY 90 tablet 0    mycophenolate (MYFORTIC) 180 MG DR tablet TAKE 3 TABLETS BY MOUTH IN THE MORNING AND TAKE 3 TABLETS BEFORE BEDTIME 180 tablet 5    allopurinol (ZYLOPRIM) 300 MG tablet TAKE ONE TABLET BY MOUTH ONCE DAILY 30 tablet 5    Everolimus 0.5 MG TABS TAKE

## 2024-04-01 RX ORDER — LABETALOL 100 MG/1
100 TABLET, FILM COATED ORAL 3 TIMES DAILY
Qty: 90 TABLET | Refills: 0 | Status: SHIPPED | OUTPATIENT
Start: 2024-04-01 | End: 2024-05-28

## 2024-04-15 RX ORDER — MYCOPHENOLIC ACID 180 MG/1
TABLET, DELAYED RELEASE ORAL
Qty: 180 TABLET | Refills: 11 | Status: SHIPPED | OUTPATIENT
Start: 2024-04-15

## 2024-04-30 ENCOUNTER — TELEPHONE (OUTPATIENT)
Dept: NEPHROLOGY | Age: 34
End: 2024-04-30

## 2024-04-30 NOTE — TELEPHONE ENCOUNTER
Ar from [employer] called asking if we received the renewal form for Wei's Everolimus. I did not see anything on Gillian's desk or in . She will refax it to Gillian Meeks's attention.  Contact number is [phone]

## 2024-05-02 RX ORDER — EVEROLIMUS 0.5 MG/1
TABLET ORAL
Qty: 120 TABLET | Refills: 11 | Status: SHIPPED | OUTPATIENT
Start: 2024-05-02

## 2024-05-28 RX ORDER — LABETALOL 100 MG/1
100 TABLET, FILM COATED ORAL 3 TIMES DAILY
Qty: 90 TABLET | Refills: 5 | Status: SHIPPED | OUTPATIENT
Start: 2024-05-28

## 2024-08-12 RX ORDER — MYCOPHENOLIC ACID 180 MG/1
TABLET, DELAYED RELEASE ORAL
Qty: 180 TABLET | Refills: 11 | Status: SHIPPED | OUTPATIENT
Start: 2024-08-12

## 2024-08-16 RX ORDER — ALLOPURINOL 300 MG/1
300 TABLET ORAL DAILY
Qty: 90 TABLET | Refills: 3 | Status: SHIPPED | OUTPATIENT
Start: 2024-08-16

## 2024-08-17 LAB
BASOPHILS ABSOLUTE: 0.05 /ΜL
BASOPHILS RELATIVE PERCENT: 0.6 %
BUN BLDV-MCNC: 11 MG/DL
CALCIUM SERPL-MCNC: 9.7 MG/DL
CHLORIDE BLD-SCNC: 109 MMOL/L
CO2: 25 MMOL/L
CREAT SERPL-MCNC: 1.1 MG/DL
EGFR: 81
EOSINOPHILS ABSOLUTE: 0.12 /ΜL
EOSINOPHILS RELATIVE PERCENT: 1.5 %
GLUCOSE BLD-MCNC: 89 MG/DL
HCT VFR BLD CALC: 43.2 % (ref 41–53)
HEMOGLOBIN: 13.9 G/DL (ref 13.5–17.5)
IRON % SATURATION: 27
IRON: 59
LYMPHOCYTES ABSOLUTE: 1.25 /ΜL
LYMPHOCYTES RELATIVE PERCENT: 15.5 %
MCH RBC QN AUTO: 27.7 PG
MCHC RBC AUTO-ENTMCNC: 32.2 G/DL
MCV RBC AUTO: 86.1 FL
MONOCYTES ABSOLUTE: 0.72 /ΜL
MONOCYTES RELATIVE PERCENT: 8.9 %
NEUTROPHILS ABSOLUTE: 5.9 /ΜL
NEUTROPHILS RELATIVE PERCENT: 73 %
PLATELET # BLD: 392 K/ΜL
PMV BLD AUTO: 9.1 FL
POTASSIUM SERPL-SCNC: 4.1 MMOL/L
RBC # BLD: 5.02 10^6/ΜL
SODIUM BLD-SCNC: 142 MMOL/L
WBC # BLD: 8.08 10^3/ML

## 2024-08-21 LAB — EVEROLIMUS LEVEL: 4.4 NG/ML

## 2024-09-18 ENCOUNTER — OFFICE VISIT (OUTPATIENT)
Dept: NEPHROLOGY | Age: 34
End: 2024-09-18
Payer: MEDICARE

## 2024-09-18 VITALS
SYSTOLIC BLOOD PRESSURE: 116 MMHG | OXYGEN SATURATION: 90 % | HEIGHT: 67 IN | HEART RATE: 86 BPM | DIASTOLIC BLOOD PRESSURE: 77 MMHG | BODY MASS INDEX: 32.65 KG/M2 | WEIGHT: 208 LBS

## 2024-09-18 DIAGNOSIS — N18.2 CKD (CHRONIC KIDNEY DISEASE), STAGE II: ICD-10-CM

## 2024-09-18 DIAGNOSIS — Z94.0 KIDNEY TRANSPLANT RECIPIENT: Primary | ICD-10-CM

## 2024-09-18 DIAGNOSIS — I10 ESSENTIAL HYPERTENSION: ICD-10-CM

## 2024-09-18 PROCEDURE — G8417 CALC BMI ABV UP PARAM F/U: HCPCS | Performed by: INTERNAL MEDICINE

## 2024-09-18 PROCEDURE — 3074F SYST BP LT 130 MM HG: CPT | Performed by: INTERNAL MEDICINE

## 2024-09-18 PROCEDURE — G8427 DOCREV CUR MEDS BY ELIG CLIN: HCPCS | Performed by: INTERNAL MEDICINE

## 2024-09-18 PROCEDURE — 99214 OFFICE O/P EST MOD 30 MIN: CPT | Performed by: INTERNAL MEDICINE

## 2024-09-18 PROCEDURE — 1036F TOBACCO NON-USER: CPT | Performed by: INTERNAL MEDICINE

## 2024-09-18 PROCEDURE — G2211 COMPLEX E/M VISIT ADD ON: HCPCS | Performed by: INTERNAL MEDICINE

## 2024-09-18 PROCEDURE — 3078F DIAST BP <80 MM HG: CPT | Performed by: INTERNAL MEDICINE

## 2024-11-06 ENCOUNTER — OFFICE VISIT (OUTPATIENT)
Dept: PULMONOLOGY | Age: 34
End: 2024-11-06
Payer: MEDICARE

## 2024-11-06 VITALS
TEMPERATURE: 97.3 F | OXYGEN SATURATION: 98 % | HEIGHT: 68 IN | BODY MASS INDEX: 30.43 KG/M2 | HEART RATE: 79 BPM | WEIGHT: 200.8 LBS | DIASTOLIC BLOOD PRESSURE: 80 MMHG | SYSTOLIC BLOOD PRESSURE: 126 MMHG

## 2024-11-06 DIAGNOSIS — F51.05 INSOMNIA DUE TO OTHER MENTAL DISORDER: ICD-10-CM

## 2024-11-06 DIAGNOSIS — F79 MENTAL DISABILITY: ICD-10-CM

## 2024-11-06 DIAGNOSIS — G80.9 CEREBRAL PALSY, UNSPECIFIED TYPE (HCC): ICD-10-CM

## 2024-11-06 DIAGNOSIS — G47.33 OSA TREATED WITH BIPAP: Primary | ICD-10-CM

## 2024-11-06 DIAGNOSIS — F99 INSOMNIA DUE TO OTHER MENTAL DISORDER: ICD-10-CM

## 2024-11-06 PROCEDURE — 3074F SYST BP LT 130 MM HG: CPT | Performed by: NURSE PRACTITIONER

## 2024-11-06 PROCEDURE — G8427 DOCREV CUR MEDS BY ELIG CLIN: HCPCS | Performed by: NURSE PRACTITIONER

## 2024-11-06 PROCEDURE — 99214 OFFICE O/P EST MOD 30 MIN: CPT | Performed by: NURSE PRACTITIONER

## 2024-11-06 PROCEDURE — G8417 CALC BMI ABV UP PARAM F/U: HCPCS | Performed by: NURSE PRACTITIONER

## 2024-11-06 PROCEDURE — G8484 FLU IMMUNIZE NO ADMIN: HCPCS | Performed by: NURSE PRACTITIONER

## 2024-11-06 PROCEDURE — 1036F TOBACCO NON-USER: CPT | Performed by: NURSE PRACTITIONER

## 2024-11-06 PROCEDURE — 3079F DIAST BP 80-89 MM HG: CPT | Performed by: NURSE PRACTITIONER

## 2024-11-06 NOTE — PROGRESS NOTES
[facility]      Jayjay Johnson         [id number]  11/6/2024   Chief Complaint   Patient presents with    Follow-up     1 year FARIHA with download         Pt of Dr. Stefanie MARLOW Download:   Original or initial AHI: 7.5     Date of initial study: 7.11.18      Compliant  93%     Noncompliant 7 %     PAP Type [employer] Level  10/16   Avg Hrs/Day 6hrs 20 m  AHI: 0.7   Leaks : 95 th percentile: 11.0   Recorded compliance dates , 10.6.24  to 10.4.24   Machine/Mfg:   [x] ResMed    [] Respironics/Dreamstation   Interface:   [] Nasal    [x] Nasal pillows   [] FFM      Provider:      [] [facility]     [][facility]     [] [employer]    [x] [facility] [place]    [] [facility]               [] [facility]      [] [employer]    [] [place]:      [] Other    Neck Size: 18  Mallampati 4  ESS:  8  SAQLI: 60    Here is a scan of the most recent download:              Presentation:   Jayjay presents for 1 yearsle medicine follow up for obstructive sleep apnea  Since the last visit, Jayjay accompanied by his mother, she is his primary care giver   Mother reports that his compliance has been getting worse. She finds it off his face a lot . She has to help him put his mask on . Thinks maybe he is getting up to go the bathroom and doesn't put his mask back on   She does assist him to put it on with naps during the day.   Underlying insomnia - been on trazodone and risperidone in past, \" he was like a zombie\" meds were stopped   Recently mother restarted Risperidone to help with his agitation , mother plans to discuss meds with neurology [facility] later       Progress History:   Since last visit any new medical issues? Yes- increased agitation   Any trouble with Machine No  Any new sleep medicines? no  Trouble Falling Asleep No  Trouble Staying Asleep Yes       Equipment issues:  The pressure is  acceptable, the mask is acceptable     Review of Systems -   Review of Systems   Constitutional:  Positive for

## 2024-12-30 RX ORDER — LABETALOL 100 MG/1
100 TABLET, FILM COATED ORAL 3 TIMES DAILY
Qty: 90 TABLET | Refills: 5 | Status: SHIPPED | OUTPATIENT
Start: 2024-12-30

## 2025-01-08 RX ORDER — MYCOPHENOLIC ACID 180 MG/1
TABLET, DELAYED RELEASE ORAL
Qty: 180 TABLET | Refills: 11 | Status: SHIPPED | OUTPATIENT
Start: 2025-01-08

## 2025-02-03 RX ORDER — MYCOPHENOLIC ACID 180 MG/1
TABLET, DELAYED RELEASE ORAL
Qty: 180 TABLET | Refills: 0 | Status: SHIPPED | OUTPATIENT
Start: 2025-02-03

## 2025-03-15 LAB
ALBUMIN: 4.6 G/DL
ALP BLD-CCNC: 79 U/L
ALT SERPL-CCNC: 34 U/L
ANION GAP SERPL CALCULATED.3IONS-SCNC: 17 MMOL/L
AST SERPL-CCNC: 43 U/L
BILIRUB SERPL-MCNC: 0.7 MG/DL (ref 0.1–1.4)
BUN BLDV-MCNC: 14 MG/DL
CALCIUM SERPL-MCNC: 9.5 MG/DL
CHLORIDE BLD-SCNC: 107 MMOL/L
CO2: 22 MMOL/L
CREAT SERPL-MCNC: 1 MG/DL
GFR, ESTIMATED: 90
GLUCOSE BLD-MCNC: 87 MG/DL
POTASSIUM SERPL-SCNC: 4.3 MMOL/L
SODIUM BLD-SCNC: 141 MMOL/L
TOTAL PROTEIN: 7.6 G/DL (ref 6.4–8.2)

## 2025-03-24 ENCOUNTER — OFFICE VISIT (OUTPATIENT)
Dept: NEPHROLOGY | Age: 35
End: 2025-03-24
Payer: MEDICARE

## 2025-03-24 VITALS
DIASTOLIC BLOOD PRESSURE: 90 MMHG | HEART RATE: 68 BPM | BODY MASS INDEX: 33.91 KG/M2 | OXYGEN SATURATION: 98 % | SYSTOLIC BLOOD PRESSURE: 158 MMHG | WEIGHT: 223 LBS

## 2025-03-24 DIAGNOSIS — Z94.0 KIDNEY TRANSPLANT RECIPIENT: Primary | ICD-10-CM

## 2025-03-24 DIAGNOSIS — N18.2 CKD (CHRONIC KIDNEY DISEASE), STAGE II: ICD-10-CM

## 2025-03-24 DIAGNOSIS — I10 ESSENTIAL HYPERTENSION: ICD-10-CM

## 2025-03-24 PROCEDURE — G8427 DOCREV CUR MEDS BY ELIG CLIN: HCPCS | Performed by: INTERNAL MEDICINE

## 2025-03-24 PROCEDURE — 3077F SYST BP >= 140 MM HG: CPT | Performed by: INTERNAL MEDICINE

## 2025-03-24 PROCEDURE — 1036F TOBACCO NON-USER: CPT | Performed by: INTERNAL MEDICINE

## 2025-03-24 PROCEDURE — G8417 CALC BMI ABV UP PARAM F/U: HCPCS | Performed by: INTERNAL MEDICINE

## 2025-03-24 PROCEDURE — G2211 COMPLEX E/M VISIT ADD ON: HCPCS | Performed by: INTERNAL MEDICINE

## 2025-03-24 PROCEDURE — 99214 OFFICE O/P EST MOD 30 MIN: CPT | Performed by: INTERNAL MEDICINE

## 2025-03-24 PROCEDURE — 3080F DIAST BP >= 90 MM HG: CPT | Performed by: INTERNAL MEDICINE

## 2025-03-24 NOTE — PROGRESS NOTES
[facility]        Outpatient Follow-Up note         3/24/2025 2:04 PM    Patient Name:   Jayjay Johnson  YOB: 1990  Primary Care Physician:  Jovon Liz MD     Chief Complaint / Reason for follow-up : Follow Up of Kidney Transplant     Interval History :  Patient seen and examined by me. Feels well.  No hospitalizations and no med changes     Living Kidney Transplant (moms sisters kidney ) - 12/2017 from aunt at [facility]  No Rejections . He is off gengraf   On evorolimus and cell cept     Past History :  Past Medical History:   Diagnosis Date    Anemia in chronic kidney disease(285.21)     Bowel obstruction (HCC) 1988    Cerebral palsy (HCC)     Chronic kidney disease, stage IV (severe) (HCC)     if develops ESRD would do peritoneal dialysis and possible kidney transplant.    Developmental disorder     Hard of hearing     Hypertension     Hypothyroidism     Mentally disabled     Pre-transplant evaluation for CKD (chronic kidney disease) 04/20/2017    mailed to [facility] Pre Kidney transplant    Proteinuria      Past Surgical History:   Procedure Laterality Date    ABDOMEN SURGERY      bowel obstruction and repair-1988, g tube placed and removed age 2    ADENOIDECTOMY      EYE SURGERY  age 1    bilateral eyes    TYMPANOSTOMY TUBE PLACEMENT      multiple up to age 6        Medications :     Outpatient Medications Marked as Taking for the 3/24/25 encounter (Office Visit) with Stephen Freeman MD   Medication Sig Dispense Refill    mycophenolate (MYFORTIC) 180 MG DR tablet TAKE THREE TABLETS BY MOUTH IN THE MORNING AND TAKE THREE TABLETS BEFORE BEDTIME 180 tablet 0    labetalol (NORMODYNE) 100 MG tablet TAKE 1 TABLET BY MOUTH THREE TIMES DAILY 90 tablet 5    allopurinol (ZYLOPRIM) 300 MG tablet Take 1 tablet by mouth daily 90 tablet 3    Everolimus 0.5 MG TABS TAKE 2 TABLETS BY MOUTH EVERY 12 HOURS 120 tablet 11    famotidine (PEPCID) 20 MG tablet 1

## 2025-04-28 RX ORDER — EVEROLIMUS 0.5 MG/1
TABLET ORAL
Qty: 120 TABLET | Refills: 5 | Status: SHIPPED | OUTPATIENT
Start: 2025-04-28

## 2025-04-28 RX ORDER — EVEROLIMUS 0.5 MG/1
TABLET ORAL
Qty: 120 TABLET | Refills: 0 | Status: SHIPPED | OUTPATIENT
Start: 2025-04-28 | End: 2025-04-28 | Stop reason: SDUPTHER

## 2025-05-27 RX ORDER — EVEROLIMUS 0.5 MG/1
TABLET ORAL
Qty: 120 TABLET | Refills: 5 | Status: SHIPPED | OUTPATIENT
Start: 2025-05-27

## 2025-06-17 ENCOUNTER — TELEPHONE (OUTPATIENT)
Dept: NEPHROLOGY | Age: 35
End: 2025-06-17

## 2025-06-17 NOTE — TELEPHONE ENCOUNTER
Elizabeth mother wants to know if jose can take a decongestant for a few days for a head cold? Elizabeth [phone]

## 2025-08-12 RX ORDER — ALLOPURINOL 300 MG/1
300 TABLET ORAL DAILY
Qty: 90 TABLET | Refills: 3 | Status: SHIPPED | OUTPATIENT
Start: 2025-08-12